# Patient Record
Sex: FEMALE | Race: WHITE | NOT HISPANIC OR LATINO
[De-identification: names, ages, dates, MRNs, and addresses within clinical notes are randomized per-mention and may not be internally consistent; named-entity substitution may affect disease eponyms.]

---

## 2020-02-12 PROBLEM — Z00.00 ENCOUNTER FOR PREVENTIVE HEALTH EXAMINATION: Status: ACTIVE | Noted: 2020-02-12

## 2020-02-21 ENCOUNTER — APPOINTMENT (OUTPATIENT)
Dept: FAMILY MEDICINE | Facility: CLINIC | Age: 50
End: 2020-02-21

## 2020-02-21 ENCOUNTER — APPOINTMENT (OUTPATIENT)
Dept: OBGYN | Facility: CLINIC | Age: 50
End: 2020-02-21
Payer: MEDICAID

## 2020-02-21 VITALS
BODY MASS INDEX: 19.66 KG/M2 | HEIGHT: 65 IN | WEIGHT: 118 LBS | SYSTOLIC BLOOD PRESSURE: 118 MMHG | DIASTOLIC BLOOD PRESSURE: 70 MMHG

## 2020-02-21 DIAGNOSIS — Z80.0 FAMILY HISTORY OF MALIGNANT NEOPLASM OF DIGESTIVE ORGANS: ICD-10-CM

## 2020-02-21 DIAGNOSIS — Z78.9 OTHER SPECIFIED HEALTH STATUS: ICD-10-CM

## 2020-02-21 DIAGNOSIS — Z83.3 FAMILY HISTORY OF DIABETES MELLITUS: ICD-10-CM

## 2020-02-21 DIAGNOSIS — Z82.49 FAMILY HISTORY OF ISCHEMIC HEART DISEASE AND OTHER DISEASES OF THE CIRCULATORY SYSTEM: ICD-10-CM

## 2020-02-21 LAB
HCG UR QL: NEGATIVE
QUALITY CONTROL: YES

## 2020-02-21 PROCEDURE — 81025 URINE PREGNANCY TEST: CPT

## 2020-02-21 PROCEDURE — 99386 PREV VISIT NEW AGE 40-64: CPT

## 2020-02-21 NOTE — HISTORY OF PRESENT ILLNESS
[Last Mammogram ___] : Last Mammogram was [unfilled] [Last Pap ___] : Last cervical pap smear was [unfilled] [Irregular Menses] : irregular menses [Definite:  ___ (Date)] : the last menstrual period was [unfilled] [Sexually Active] : is sexually active [Male ___] : [unfilled] male

## 2020-02-21 NOTE — PHYSICAL EXAM
[Awake] : awake [Alert] : alert [Soft] : soft [Oriented x3] : oriented to person, place, and time [Normal] : uterus [Scant] : there was scant vaginal bleeding [IUD String] : had an IUD string protruding out [Adnexa Tenderness On The Right] : was tender to palpation [Acute Distress] : no acute distress [Mass] : no breast mass [Nipple Discharge] : no nipple discharge [Axillary LAD] : no axillary lymphadenopathy [Tender] : non tender

## 2020-02-21 NOTE — REVIEW OF SYSTEMS
[Chest Pain] : chest pain [Palpitations] : palpitations [Burning Sensation] : burning sensation during urination [Nocturia] : nocturia [FreeTextEntry1] : neck pain

## 2020-02-21 NOTE — DISCUSSION/SUMMARY
[FreeTextEntry1] : Discussed with the Mirena, spotting and irregular bleeding is not uncommon. \par Pelvic sonogram to be scheduled for right lower quadrant pelvic pain.\par Rx sent for 800mg ibuprofen for pain.\par Referral for mammogram and sonogram given. \par States she is scheduled for a colonoscopy on Tuesday with her gastroenterologist. \par Concerns addressed, questions answered. Patient verbalizes understanding.

## 2020-02-22 LAB
C TRACH RRNA SPEC QL NAA+PROBE: NOT DETECTED
N GONORRHOEA RRNA SPEC QL NAA+PROBE: NOT DETECTED
SOURCE TP AMPLIFICATION: NORMAL

## 2020-02-25 LAB
CYTOLOGY CVX/VAG DOC THIN PREP: NORMAL
HPV HIGH+LOW RISK DNA PNL CVX: NOT DETECTED

## 2020-02-26 ENCOUNTER — APPOINTMENT (OUTPATIENT)
Dept: OBGYN | Facility: CLINIC | Age: 50
End: 2020-02-26
Payer: MEDICAID

## 2020-02-26 ENCOUNTER — ASOB RESULT (OUTPATIENT)
Age: 50
End: 2020-02-26

## 2020-02-26 VITALS
BODY MASS INDEX: 22.2 KG/M2 | HEIGHT: 64 IN | WEIGHT: 130 LBS | DIASTOLIC BLOOD PRESSURE: 60 MMHG | SYSTOLIC BLOOD PRESSURE: 100 MMHG

## 2020-02-26 PROCEDURE — 76376 3D RENDER W/INTRP POSTPROCES: CPT | Mod: 59

## 2020-02-26 PROCEDURE — 99213 OFFICE O/P EST LOW 20 MIN: CPT | Mod: 25

## 2020-02-26 PROCEDURE — 76830 TRANSVAGINAL US NON-OB: CPT

## 2020-02-26 NOTE — PHYSICAL EXAM
[Awake] : awake [Alert] : alert [Soft] : soft [Oriented x3] : oriented to person, place, and time [Tender] : non tender

## 2020-02-26 NOTE — HISTORY OF PRESENT ILLNESS
[Last Mammogram ___] : Last Mammogram was [unfilled] [Last Pap ___] : Last cervical pap smear was [unfilled] [Menstrual Problems] : reports abnormal menses [Excessive Bleeding] : bleeding has been excessive [Irregular Cycle Intervals] : are  irregular [Irregular Duration] : has been irregular [Dysmenorrhea] : dysmenorrhea [Total Preg ___] : [unfilled] [Pregnancy History] : pregnancy history: [Full Term ___] : [unfilled] [Living ___] : [unfilled] [AB Spont ___] : miscarriages: [unfilled] [Definite:  ___ (Date)] : the last menstrual period was [unfilled] [Sexually Active] : is sexually active [Menarche Age: ____] : age at menarche was [unfilled] [Contraception] : uses contraception [Male ___] : [unfilled] male [IUD] : has an intrauterine device [Irregular Menses] : irregular menses [Light Bleeding] : described as light in severity [Prolonged Menses] : prolonged menses [de-identified] : Pelvic u/s: 02/26/2020 [de-identified] : Mirena [Pain] : no pelvic pain [Dizziness] : no dizziness [Monogamous] : is not monogamous

## 2020-02-26 NOTE — DISCUSSION/SUMMARY
[FreeTextEntry1] : Discussed pap smear results from last visit.\par Liz amador reviewed with patient: IUD was seen in uterus; a small fibroid noted anterior submucosal; right ovary appers within normal limits; a simple cyst on left ovary and no free fluid.\par Discussed abnormal bleeding continues. She has Mirena in place since January/2019 for the irregular bleeding and pelvic pain but has not resolved the abnormal bleeding. \par Reviewed hysteroscopy procedure. Will be making appointment today. Advised to take ibuprofen prior to procedure.\par If vaginal bleeding and pelvic pain persists or worsens, is to contact office and come in sooner.\par Patient concerns addressed, questions answered. She verbalized understanding.

## 2020-03-03 ENCOUNTER — RESULT CHARGE (OUTPATIENT)
Age: 50
End: 2020-03-03

## 2020-03-03 ENCOUNTER — APPOINTMENT (OUTPATIENT)
Dept: OBGYN | Facility: CLINIC | Age: 50
End: 2020-03-03
Payer: MEDICAID

## 2020-03-03 VITALS
BODY MASS INDEX: 21.85 KG/M2 | HEIGHT: 64 IN | SYSTOLIC BLOOD PRESSURE: 104 MMHG | DIASTOLIC BLOOD PRESSURE: 52 MMHG | WEIGHT: 128 LBS

## 2020-03-03 DIAGNOSIS — Z01.419 ENCOUNTER FOR GYNECOLOGICAL EXAMINATION (GENERAL) (ROUTINE) W/OUT ABNORMAL FINDINGS: ICD-10-CM

## 2020-03-03 DIAGNOSIS — Z12.39 ENCOUNTER FOR OTHER SCREENING FOR MALIGNANT NEOPLASM OF BREAST: ICD-10-CM

## 2020-03-03 LAB
BILIRUB UR QL STRIP: NORMAL
GLUCOSE UR-MCNC: NORMAL
HCG UR QL: 0.2 EU/DL
HCG UR QL: NEGATIVE
HGB UR QL STRIP.AUTO: ABNORMAL
KETONES UR-MCNC: NORMAL
LEUKOCYTE ESTERASE UR QL STRIP: NORMAL
NITRITE UR QL STRIP: NORMAL
PH UR STRIP: 6.5
PROT UR STRIP-MCNC: NORMAL
QUALITY CONTROL: YES
SP GR UR STRIP: 1.02

## 2020-03-03 PROCEDURE — 58558Z: CUSTOM

## 2020-03-03 PROCEDURE — 81003 URINALYSIS AUTO W/O SCOPE: CPT | Mod: QW

## 2020-03-03 PROCEDURE — 36415 COLL VENOUS BLD VENIPUNCTURE: CPT

## 2020-03-03 PROCEDURE — 58301 REMOVE INTRAUTERINE DEVICE: CPT | Mod: 59

## 2020-03-03 PROCEDURE — 81025 URINE PREGNANCY TEST: CPT

## 2020-03-03 NOTE — PROCEDURE
[Alternatives] : alternatives [Benefits] : benefits [Allergic Reaction] : allergic reaction [Bleeding] : bleeding [Pain] : pain [Uterine Perforation] : uterine perforation [LMP ___] : LMP was [unfilled] [Neg Pregnancy Test] : a pregnancy test was negative [Tolerated Well] : the patient tolerated the procedure well [No Complications] : there were no complications

## 2020-03-03 NOTE — PHYSICAL EXAM
[Awake] : awake [Alert] : alert [Oriented x3] : oriented to person, place, and time [Labia Majora] : labia major [Labia Minora] : labia minora [Normal] : clitoris [Depressed Mood] : not depressed [Acute Distress] : no acute distress [Flat Affect] : affect not flat [FreeTextEntry5] : IUD was removed

## 2020-03-03 NOTE — REVIEW OF SYSTEMS
[Nl] : Integumentary [Fever] : no fever [Chills] : no chills [Pelvic Pain] : pelvic pain [Abn Vag Bleeding] : abnormal vaginal bleeding

## 2020-03-03 NOTE — HISTORY OF PRESENT ILLNESS
[___ Month(s) Ago] : [unfilled] month(s) ago [Good] : being in good health [Last Pap ___] : Last cervical pap smear was [unfilled] [Healthy Diet] : a healthy diet [Perimenopausal] : is perimenopausal [Pregnancy History] : pregnancy history: [Definite:  ___ (Date)] : the last menstrual period was [unfilled] [Menarche Age: ____] : age at menarche was [unfilled] [Sexually Active] : is sexually active [Monogamous] : is monogamous [Contraception] : uses contraception [Male ___] : [unfilled] male [Medroxyprogesterone Injection] : uses medroxyprogesterone acetate injection [No] : no [Hot Flashes] : no hot flashes [Night Sweats] : no night sweats

## 2020-03-04 LAB
BASOPHILS # BLD AUTO: 0.04 K/UL
BASOPHILS NFR BLD AUTO: 0.7 %
EOSINOPHIL # BLD AUTO: 0.1 K/UL
EOSINOPHIL NFR BLD AUTO: 1.8 %
FSH SERPL-MCNC: 3.9 IU/L
HCT VFR BLD CALC: 42.5 %
HGB BLD-MCNC: 13.3 G/DL
IMM GRANULOCYTES NFR BLD AUTO: 0.2 %
LH SERPL-ACNC: 9.8 IU/L
LYMPHOCYTES # BLD AUTO: 2.09 K/UL
LYMPHOCYTES NFR BLD AUTO: 36.6 %
MAN DIFF?: NORMAL
MCHC RBC-ENTMCNC: 28.7 PG
MCHC RBC-ENTMCNC: 31.3 GM/DL
MCV RBC AUTO: 91.6 FL
MONOCYTES # BLD AUTO: 0.42 K/UL
MONOCYTES NFR BLD AUTO: 7.4 %
NEUTROPHILS # BLD AUTO: 3.05 K/UL
NEUTROPHILS NFR BLD AUTO: 53.3 %
PLATELET # BLD AUTO: 318 K/UL
PROLACTIN SERPL-MCNC: 15.4 NG/ML
RBC # BLD: 4.64 M/UL
RBC # FLD: 13.2 %
TSH SERPL-ACNC: 3.33 UIU/ML
WBC # FLD AUTO: 5.71 K/UL

## 2020-03-11 ENCOUNTER — APPOINTMENT (OUTPATIENT)
Dept: MAMMOGRAPHY | Facility: CLINIC | Age: 50
End: 2020-03-11
Payer: MEDICAID

## 2020-03-11 ENCOUNTER — OUTPATIENT (OUTPATIENT)
Dept: OUTPATIENT SERVICES | Facility: HOSPITAL | Age: 50
LOS: 1 days | End: 2020-03-11
Payer: MEDICAID

## 2020-03-11 ENCOUNTER — APPOINTMENT (OUTPATIENT)
Dept: ULTRASOUND IMAGING | Facility: CLINIC | Age: 50
End: 2020-03-11
Payer: MEDICAID

## 2020-03-11 DIAGNOSIS — Z12.39 ENCOUNTER FOR OTHER SCREENING FOR MALIGNANT NEOPLASM OF BREAST: ICD-10-CM

## 2020-03-11 PROCEDURE — 77063 BREAST TOMOSYNTHESIS BI: CPT | Mod: 26

## 2020-03-11 PROCEDURE — 77067 SCR MAMMO BI INCL CAD: CPT | Mod: 26

## 2020-03-11 PROCEDURE — 77063 BREAST TOMOSYNTHESIS BI: CPT

## 2020-03-11 PROCEDURE — 77067 SCR MAMMO BI INCL CAD: CPT

## 2020-03-11 PROCEDURE — 76641 ULTRASOUND BREAST COMPLETE: CPT

## 2020-03-11 PROCEDURE — 76641 ULTRASOUND BREAST COMPLETE: CPT | Mod: 26,50

## 2020-03-13 ENCOUNTER — APPOINTMENT (OUTPATIENT)
Dept: OBGYN | Facility: CLINIC | Age: 50
End: 2020-03-13
Payer: MEDICAID

## 2020-03-13 ENCOUNTER — TRANSCRIPTION ENCOUNTER (OUTPATIENT)
Age: 50
End: 2020-03-13

## 2020-03-13 VITALS
HEIGHT: 64 IN | DIASTOLIC BLOOD PRESSURE: 64 MMHG | SYSTOLIC BLOOD PRESSURE: 102 MMHG | BODY MASS INDEX: 21 KG/M2 | WEIGHT: 123 LBS

## 2020-03-13 LAB — CORE LAB BIOPSY: NORMAL

## 2020-03-13 PROCEDURE — 99213 OFFICE O/P EST LOW 20 MIN: CPT

## 2020-03-13 RX ORDER — LEVONORGESTREL 52 MG/1
20 INTRAUTERINE DEVICE INTRAUTERINE
Refills: 0 | Status: DISCONTINUED | COMMUNITY
End: 2020-03-13

## 2020-03-13 RX ORDER — IBUPROFEN 800 MG/1
800 TABLET, FILM COATED ORAL EVERY 8 HOURS
Qty: 90 | Refills: 0 | Status: DISCONTINUED | COMMUNITY
Start: 2020-02-21 | End: 2020-03-13

## 2020-03-13 NOTE — HISTORY OF PRESENT ILLNESS
[Last Pap ___] : Last cervical pap smear was [unfilled] [Last Pap Smear ___] : last Papanicolaou cytology done [unfilled] [Annual Vaginal Sonography ___] : annual vaginal sonograph [unfilled] [Perimenopausal] : is perimenopausal [Menstrual Problems] : reports abnormal menses [Menarche Age ____] : age at menarche was [unfilled] [Definite ___ (Date)] : the last menstrual period was [unfilled] [Pregnancy History] : pregnancy history: [Total Preg ___] : [unfilled] [Full Term ___] : [unfilled] [Living ___] : [unfilled] [AB Spont ___] : miscarriages: [unfilled] [Bleeding] : bleeding [Definite:  ___ (Date)] : the last menstrual period was [unfilled] [Menarche Age: ____] : age at menarche was [unfilled] [Sexually Active] : is sexually active [Monogamous] : is monogamous [Contraception] : uses contraception [IUD] : has an intrauterine device [Male ___] : [unfilled] male [NA] : N/A [de-identified] : emb 3/3/2020

## 2020-03-13 NOTE — REVIEW OF SYSTEMS
[Nl] : Hematologic/Lymphatic [Fever] : no fever [Chills] : no chills [Pelvic Pain] : no pelvic pain [Abn Vag Bleeding] : abnormal vaginal bleeding

## 2020-04-16 ENCOUNTER — APPOINTMENT (OUTPATIENT)
Dept: OBGYN | Facility: CLINIC | Age: 50
End: 2020-04-16

## 2020-04-30 ENCOUNTER — APPOINTMENT (OUTPATIENT)
Dept: OBGYN | Facility: CLINIC | Age: 50
End: 2020-04-30

## 2020-07-27 ENCOUNTER — OUTPATIENT (OUTPATIENT)
Dept: OUTPATIENT SERVICES | Facility: HOSPITAL | Age: 50
LOS: 1 days | End: 2020-07-27
Payer: MEDICAID

## 2020-07-27 DIAGNOSIS — Z01.818 ENCOUNTER FOR OTHER PREPROCEDURAL EXAMINATION: ICD-10-CM

## 2020-07-27 LAB
ANION GAP SERPL CALC-SCNC: 11 MMOL/L — SIGNIFICANT CHANGE UP (ref 5–17)
APTT BLD: 30.5 SEC — SIGNIFICANT CHANGE UP (ref 27.5–35.5)
BASOPHILS # BLD AUTO: 0.04 K/UL — SIGNIFICANT CHANGE UP (ref 0–0.2)
BASOPHILS NFR BLD AUTO: 0.8 % — SIGNIFICANT CHANGE UP (ref 0–2)
BLD GP AB SCN SERPL QL: SIGNIFICANT CHANGE UP
BUN SERPL-MCNC: 19 MG/DL — SIGNIFICANT CHANGE UP (ref 8–20)
CALCIUM SERPL-MCNC: 9.3 MG/DL — SIGNIFICANT CHANGE UP (ref 8.6–10.2)
CHLORIDE SERPL-SCNC: 102 MMOL/L — SIGNIFICANT CHANGE UP (ref 98–107)
CO2 SERPL-SCNC: 25 MMOL/L — SIGNIFICANT CHANGE UP (ref 22–29)
CREAT SERPL-MCNC: 0.47 MG/DL — LOW (ref 0.5–1.3)
EOSINOPHIL # BLD AUTO: 0.08 K/UL — SIGNIFICANT CHANGE UP (ref 0–0.5)
EOSINOPHIL NFR BLD AUTO: 1.7 % — SIGNIFICANT CHANGE UP (ref 0–6)
GLUCOSE SERPL-MCNC: 89 MG/DL — SIGNIFICANT CHANGE UP (ref 70–99)
HCG UR QL: NEGATIVE — SIGNIFICANT CHANGE UP
HCT VFR BLD CALC: 32 % — LOW (ref 34.5–45)
HGB BLD-MCNC: 9.9 G/DL — LOW (ref 11.5–15.5)
IMM GRANULOCYTES NFR BLD AUTO: 0.2 % — SIGNIFICANT CHANGE UP (ref 0–1.5)
INR BLD: 1.08 RATIO — SIGNIFICANT CHANGE UP (ref 0.88–1.16)
LYMPHOCYTES # BLD AUTO: 1.56 K/UL — SIGNIFICANT CHANGE UP (ref 1–3.3)
LYMPHOCYTES # BLD AUTO: 32.3 % — SIGNIFICANT CHANGE UP (ref 13–44)
MCHC RBC-ENTMCNC: 24.1 PG — LOW (ref 27–34)
MCHC RBC-ENTMCNC: 30.9 GM/DL — LOW (ref 32–36)
MCV RBC AUTO: 78 FL — LOW (ref 80–100)
MONOCYTES # BLD AUTO: 0.29 K/UL — SIGNIFICANT CHANGE UP (ref 0–0.9)
MONOCYTES NFR BLD AUTO: 6 % — SIGNIFICANT CHANGE UP (ref 2–14)
NEUTROPHILS # BLD AUTO: 2.85 K/UL — SIGNIFICANT CHANGE UP (ref 1.8–7.4)
NEUTROPHILS NFR BLD AUTO: 59 % — SIGNIFICANT CHANGE UP (ref 43–77)
PLATELET # BLD AUTO: 307 K/UL — SIGNIFICANT CHANGE UP (ref 150–400)
POTASSIUM SERPL-MCNC: 4.1 MMOL/L — SIGNIFICANT CHANGE UP (ref 3.5–5.3)
POTASSIUM SERPL-SCNC: 4.1 MMOL/L — SIGNIFICANT CHANGE UP (ref 3.5–5.3)
PROTHROM AB SERPL-ACNC: 12.5 SEC — SIGNIFICANT CHANGE UP (ref 10.6–13.6)
RBC # BLD: 4.1 M/UL — SIGNIFICANT CHANGE UP (ref 3.8–5.2)
RBC # FLD: 13.5 % — SIGNIFICANT CHANGE UP (ref 10.3–14.5)
SODIUM SERPL-SCNC: 138 MMOL/L — SIGNIFICANT CHANGE UP (ref 135–145)
WBC # BLD: 4.83 K/UL — SIGNIFICANT CHANGE UP (ref 3.8–10.5)
WBC # FLD AUTO: 4.83 K/UL — SIGNIFICANT CHANGE UP (ref 3.8–10.5)

## 2020-07-27 PROCEDURE — G0463: CPT

## 2020-07-27 PROCEDURE — 93005 ELECTROCARDIOGRAM TRACING: CPT

## 2020-07-27 PROCEDURE — 93010 ELECTROCARDIOGRAM REPORT: CPT

## 2020-08-03 ENCOUNTER — APPOINTMENT (OUTPATIENT)
Dept: OBGYN | Facility: CLINIC | Age: 50
End: 2020-08-03
Payer: MEDICAID

## 2020-08-03 VITALS
DIASTOLIC BLOOD PRESSURE: 62 MMHG | BODY MASS INDEX: 22.02 KG/M2 | SYSTOLIC BLOOD PRESSURE: 104 MMHG | HEIGHT: 64 IN | WEIGHT: 129 LBS

## 2020-08-03 LAB
BILIRUB UR QL STRIP: NORMAL
GLUCOSE UR-MCNC: NORMAL
HCG UR QL: 0.2 EU/DL
HCG UR QL: NEGATIVE
HGB UR QL STRIP.AUTO: NORMAL
KETONES UR-MCNC: NORMAL
LEUKOCYTE ESTERASE UR QL STRIP: NORMAL
NITRITE UR QL STRIP: NORMAL
PH UR STRIP: 7
PROT UR STRIP-MCNC: NORMAL
QUALITY CONTROL: YES
SP GR UR STRIP: 1.01

## 2020-08-03 PROCEDURE — 99214 OFFICE O/P EST MOD 30 MIN: CPT

## 2020-08-03 PROCEDURE — 81003 URINALYSIS AUTO W/O SCOPE: CPT | Mod: QW

## 2020-08-03 PROCEDURE — 81025 URINE PREGNANCY TEST: CPT

## 2020-08-03 NOTE — REVIEW OF SYSTEMS
[Nl] : Hematologic/Lymphatic [Fever] : no fever [Chills] : no chills [Heart Rate Is Fast] : the heart rate was not fast [Palpitations] : no palpitations [Chest Pain] : no chest pain [Dyspnea] : no shortness of breath [SOB on Exertion] : no shortness of breath during exertion [Cough] : no cough [Abdominal Pain] : no abdominal pain [Vomiting] : no vomiting [Constipation] : no constipation [Diarrhea] : no diarrhea [Pelvic Pain] : no pelvic pain [Abn Vag Bleeding] : abnormal vaginal bleeding

## 2020-08-03 NOTE — HISTORY OF PRESENT ILLNESS
[Last Pap ___] : Last cervical pap smear was [unfilled] [Last Mammogram ___] : last mammogram done [unfilled] [Last Pap Smear ___] : last Papanicolaou cytology done [unfilled] [Annual Vaginal Sonography ___] : annual vaginal sonograph [unfilled] [Menstrual Problems] : reports abnormal menses [Perimenopausal] : is perimenopausal [Menarche Age ____] : age at menarche was [unfilled] [Pregnancy History] : pregnancy history: [Total Preg ___] : [unfilled] [Full Term ___] : [unfilled] [Living ___] : [unfilled] [AB Induced ___] : elective abortions: [unfilled] [Definite:  ___ (Date)] : the last menstrual period was [unfilled] [Menarche Age: ____] : age at menarche was [unfilled] [Sexually Active] : is sexually active [Contraception] : uses contraception [IUD] : has an intrauterine device [Male ___] : [unfilled] male [No] : no [de-identified] : luna  3/11/2020 br2

## 2020-08-04 ENCOUNTER — APPOINTMENT (OUTPATIENT)
Dept: DISASTER EMERGENCY | Facility: CLINIC | Age: 50
End: 2020-08-04

## 2020-08-05 LAB — SARS-COV-2 N GENE NPH QL NAA+PROBE: NOT DETECTED

## 2020-08-07 ENCOUNTER — RESULT REVIEW (OUTPATIENT)
Age: 50
End: 2020-08-07

## 2020-08-07 ENCOUNTER — APPOINTMENT (OUTPATIENT)
Dept: OBGYN | Facility: AMBULATORY SURGERY CENTER | Age: 50
End: 2020-08-07
Payer: MEDICAID

## 2020-08-07 PROCEDURE — 58558 HYSTEROSCOPY BIOPSY: CPT

## 2020-08-11 ENCOUNTER — TRANSCRIPTION ENCOUNTER (OUTPATIENT)
Age: 50
End: 2020-08-11

## 2020-08-17 ENCOUNTER — APPOINTMENT (OUTPATIENT)
Dept: OBGYN | Facility: CLINIC | Age: 50
End: 2020-08-17
Payer: MEDICAID

## 2020-08-17 VITALS
SYSTOLIC BLOOD PRESSURE: 110 MMHG | WEIGHT: 127 LBS | HEIGHT: 64 IN | DIASTOLIC BLOOD PRESSURE: 70 MMHG | BODY MASS INDEX: 21.68 KG/M2

## 2020-08-17 PROCEDURE — 99213 OFFICE O/P EST LOW 20 MIN: CPT

## 2020-08-17 PROCEDURE — 36415 COLL VENOUS BLD VENIPUNCTURE: CPT

## 2020-08-17 RX ORDER — UBIDECARENONE 200 MG
500 CAPSULE ORAL
Refills: 0 | Status: ACTIVE | COMMUNITY

## 2020-08-17 RX ORDER — CHOLECALCIFEROL (VITAMIN D3) 50 MCG
2000 CAPSULE ORAL
Refills: 0 | Status: ACTIVE | COMMUNITY

## 2020-08-17 RX ORDER — OMEGA-3/DHA/EPA/FISH OIL 300-1000MG
400 CAPSULE ORAL
Refills: 0 | Status: ACTIVE | COMMUNITY

## 2020-08-18 NOTE — REVIEW OF SYSTEMS
[Nl] : Integumentary [Fever] : no fever [Chills] : no chills [Abdominal Pain] : no abdominal pain [Vomiting] : no vomiting [Abn Vag Bleeding] : no abnormal vaginal bleeding [Pelvic Pain] : no pelvic pain [Dysuria] : no dysuria [Urgency] : no urgency

## 2020-08-18 NOTE — END OF VISIT
[FreeTextEntry3] : I, Martha Carmona, solely acted as scribe for Dr. Emelyn Liao on 08/17/2020 .\par All medical entries made by the Scribe were at my, Dr. Liao's, direction and personally dictated by me on 08/17/2020. I have reviewed the chart and agree that the record accurately reflects my personal performance of the history, physical exam, assessment and plan. I have also personally directed, reviewed, and agreed with the chart.

## 2020-08-18 NOTE — HISTORY OF PRESENT ILLNESS
[Last Mammogram ___] : Last Mammogram was [unfilled] [Last Pap ___] : Last cervical pap smear was [unfilled] [Pregnancy History] : pregnancy history: [Total Preg ___] : [unfilled] [Full Term ___] : [unfilled] [Living ___] : [unfilled] [AB Spont ___] : miscarriages: [unfilled] [Sexually Active] : is sexually active [de-identified] : Pelvic US 02/26/2020 [Fever] : no fever [Nausea] : no nausea [Vomiting] : no vomiting [Diarrhea] : no diarrhea [Vaginal Bleeding] : no vaginal bleeding [Pelvic Pressure] : no pelvic pressure [Dysuria] : no dysuria

## 2020-08-19 ENCOUNTER — TRANSCRIPTION ENCOUNTER (OUTPATIENT)
Age: 50
End: 2020-08-19

## 2020-08-19 LAB
BASOPHILS # BLD AUTO: 0.05 K/UL
BASOPHILS NFR BLD AUTO: 1 %
EOSINOPHIL # BLD AUTO: 0.11 K/UL
EOSINOPHIL NFR BLD AUTO: 2.1 %
HCT VFR BLD CALC: 32.3 %
HGB BLD-MCNC: 9.5 G/DL
IMM GRANULOCYTES NFR BLD AUTO: 0.2 %
LYMPHOCYTES # BLD AUTO: 1.81 K/UL
LYMPHOCYTES NFR BLD AUTO: 35.2 %
MAN DIFF?: NORMAL
MCHC RBC-ENTMCNC: 23 PG
MCHC RBC-ENTMCNC: 29.4 GM/DL
MCV RBC AUTO: 78.2 FL
MONOCYTES # BLD AUTO: 0.3 K/UL
MONOCYTES NFR BLD AUTO: 5.8 %
NEUTROPHILS # BLD AUTO: 2.86 K/UL
NEUTROPHILS NFR BLD AUTO: 55.7 %
PLATELET # BLD AUTO: 354 K/UL
RBC # BLD: 4.13 M/UL
RBC # FLD: 15.2 %
TSH SERPL-ACNC: 3.72 UIU/ML
WBC # FLD AUTO: 5.14 K/UL

## 2021-03-10 ENCOUNTER — OUTPATIENT (OUTPATIENT)
Dept: OUTPATIENT SERVICES | Facility: HOSPITAL | Age: 51
LOS: 1 days | End: 2021-03-10
Payer: COMMERCIAL

## 2021-03-10 ENCOUNTER — APPOINTMENT (OUTPATIENT)
Dept: ULTRASOUND IMAGING | Facility: CLINIC | Age: 51
End: 2021-03-10
Payer: MEDICAID

## 2021-03-10 ENCOUNTER — APPOINTMENT (OUTPATIENT)
Dept: MAMMOGRAPHY | Facility: CLINIC | Age: 51
End: 2021-03-10
Payer: MEDICAID

## 2021-03-10 DIAGNOSIS — Z00.8 ENCOUNTER FOR OTHER GENERAL EXAMINATION: ICD-10-CM

## 2021-03-10 DIAGNOSIS — Z00.00 ENCOUNTER FOR GENERAL ADULT MEDICAL EXAMINATION WITHOUT ABNORMAL FINDINGS: ICD-10-CM

## 2021-03-10 PROCEDURE — G0279: CPT | Mod: 26

## 2021-03-10 PROCEDURE — 77066 DX MAMMO INCL CAD BI: CPT | Mod: 26

## 2021-03-10 PROCEDURE — 76641 ULTRASOUND BREAST COMPLETE: CPT | Mod: 26,50

## 2021-03-10 PROCEDURE — 77066 DX MAMMO INCL CAD BI: CPT

## 2021-03-10 PROCEDURE — 76641 ULTRASOUND BREAST COMPLETE: CPT

## 2021-03-10 PROCEDURE — G0279: CPT

## 2021-03-19 ENCOUNTER — APPOINTMENT (OUTPATIENT)
Dept: OBGYN | Facility: CLINIC | Age: 51
End: 2021-03-19
Payer: MEDICAID

## 2021-03-19 VITALS
BODY MASS INDEX: 21.51 KG/M2 | SYSTOLIC BLOOD PRESSURE: 112 MMHG | DIASTOLIC BLOOD PRESSURE: 70 MMHG | HEIGHT: 64 IN | TEMPERATURE: 97 F | WEIGHT: 126 LBS

## 2021-03-19 DIAGNOSIS — Z86.2 PERSONAL HISTORY OF DISEASES OF THE BLOOD AND BLOOD-FORMING ORGANS AND CERTAIN DISORDERS INVOLVING THE IMMUNE MECHANISM: ICD-10-CM

## 2021-03-19 DIAGNOSIS — N84.0 POLYP OF CORPUS UTERI: ICD-10-CM

## 2021-03-19 DIAGNOSIS — N92.1 EXCESSIVE AND FREQUENT MENSTRUATION WITH IRREGULAR CYCLE: ICD-10-CM

## 2021-03-19 DIAGNOSIS — R10.2 PELVIC AND PERINEAL PAIN: ICD-10-CM

## 2021-03-19 DIAGNOSIS — Z01.818 ENCOUNTER FOR OTHER PREPROCEDURAL EXAMINATION: ICD-10-CM

## 2021-03-19 PROCEDURE — 99072 ADDL SUPL MATRL&STAF TM PHE: CPT

## 2021-03-19 PROCEDURE — 99396 PREV VISIT EST AGE 40-64: CPT

## 2021-03-20 PROBLEM — Z01.818 PRE-OP TESTING: Status: RESOLVED | Noted: 2020-07-31 | Resolved: 2021-03-20

## 2021-03-20 PROBLEM — R10.2 PELVIC PAIN: Status: RESOLVED | Noted: 2020-02-21 | Resolved: 2021-03-20

## 2021-03-20 PROBLEM — Z86.2 HISTORY OF ANEMIA: Status: RESOLVED | Noted: 2020-08-17 | Resolved: 2021-03-20

## 2021-03-20 PROBLEM — N92.1 MENOMETRORRHAGIA: Status: RESOLVED | Noted: 2020-03-03 | Resolved: 2021-03-20

## 2021-03-20 PROBLEM — N84.0 ENDOMETRIAL POLYP: Status: RESOLVED | Noted: 2020-03-03 | Resolved: 2021-03-20

## 2021-03-20 NOTE — DISCUSSION/SUMMARY
[FreeTextEntry1] : Pap done today. \par \par Prescription for mammogram screening and breast US given. \par \par Labs reviewed from primary which revealed she was no longer anemic. \par \par Discussed IUD risks and benefits in details. IUD literature provided. Patient will consider. \par \par Self-breast exam reviewed. \par \par Colonoscopy encouraged. \par \par She will follow up annually and as needed.

## 2021-03-20 NOTE — HISTORY OF PRESENT ILLNESS
[N] : Patient does not use contraception [Menarche Age: ____] : age at menarche was [unfilled] [Currently Active] : currently active [Men] : men [No] : No [Patient refuses STI testing] : Patient refuses STI testing [Y] : Positive pregnancy history [Mammogramdate] : 03/11/2020 [TextBox_19] : BR 2 [PapSmeardate] : 02/21/2020 [TextBox_31] : neg [HPVDate] : 02/21/2020 [TextBox_78] : neg [LMPDate] : 03/07/2021 [PGHxTotal] : 2 [[facility]] : 2 [[facility]] : 2 [FreeTextEntry1] : 03/07/21

## 2021-03-20 NOTE — END OF VISIT
[FreeTextEntry3] : I, Teresa Ignacio, solely acted as a scribe for Dr. Emelyn Liao on 03/19/2021 . All medical entries made by the Scribe were at my, Dr. Liao's, direction and personally dictated by me on 03/19/2021. I have reviewed the chart and agree that the record accurately reflects my personal performance of the history, physical exam, assessment and plan. I have also personally directed, reviewed and agreed with the chart.

## 2021-03-20 NOTE — PHYSICAL EXAM
[Appropriately responsive] : appropriately responsive [Alert] : alert [No Acute Distress] : no acute distress [Soft] : soft [Non-tender] : non-tender [Non-distended] : non-distended [Oriented x3] : oriented x3 [Examination Of The Breasts] : a normal appearance [No Discharge] : no discharge [No Masses] : no breast masses were palpable [Labia Majora] : normal [Labia Minora] : normal [No Bleeding] : There was no active vaginal bleeding [Normal] : normal [Uterine Adnexae] : normal [FreeTextEntry9] : Guaiac negative

## 2021-03-21 LAB — HPV HIGH+LOW RISK DNA PNL CVX: NOT DETECTED

## 2021-03-25 ENCOUNTER — APPOINTMENT (OUTPATIENT)
Dept: OBGYN | Facility: CLINIC | Age: 51
End: 2021-03-25
Payer: MEDICAID

## 2021-03-25 ENCOUNTER — ASOB RESULT (OUTPATIENT)
Age: 51
End: 2021-03-25

## 2021-03-25 VITALS
HEIGHT: 64 IN | BODY MASS INDEX: 21.51 KG/M2 | WEIGHT: 126 LBS | SYSTOLIC BLOOD PRESSURE: 110 MMHG | TEMPERATURE: 96 F | DIASTOLIC BLOOD PRESSURE: 68 MMHG

## 2021-03-25 DIAGNOSIS — R92.2 INCONCLUSIVE MAMMOGRAM: ICD-10-CM

## 2021-03-25 DIAGNOSIS — Z01.419 ENCOUNTER FOR GYNECOLOGICAL EXAMINATION (GENERAL) (ROUTINE) W/OUT ABNORMAL FINDINGS: ICD-10-CM

## 2021-03-25 DIAGNOSIS — Z92.89 PERSONAL HISTORY OF OTHER MEDICAL TREATMENT: ICD-10-CM

## 2021-03-25 LAB
CYTOLOGY CVX/VAG DOC THIN PREP: NORMAL
HCG UR QL: NEGATIVE
QUALITY CONTROL: YES

## 2021-03-25 PROCEDURE — 99072 ADDL SUPL MATRL&STAF TM PHE: CPT

## 2021-03-25 PROCEDURE — 81025 URINE PREGNANCY TEST: CPT

## 2021-03-25 PROCEDURE — 76376 3D RENDER W/INTRP POSTPROCES: CPT | Mod: 59

## 2021-03-25 PROCEDURE — 58300 INSERT INTRAUTERINE DEVICE: CPT

## 2021-03-25 PROCEDURE — 76830 TRANSVAGINAL US NON-OB: CPT

## 2021-03-25 NOTE — DISCUSSION/SUMMARY
[FreeTextEntry1] : Mirena IUD was inserted without difficulty today. Patient tolerated well. Post procedure transvaginal sonogram confirmed correct intrauterine placement of the Mirena IUD. Call parameters discussed.\par \par She will follow up for IUD check with sono to confirm in 2 months. All questions and concerns were addressed.

## 2021-03-25 NOTE — PROCEDURE
[IUD Placement] : intrauterine device (IUD) placement [Time out performed] : Pre-procedure time out performed.  Patient's name, date of birth and procedure confirmed. [Consent Obtained] : Consent obtained [Risks] : risks [Benefits] : benefits [Alternatives] : alternatives [Patient] : patient [Infection] : infection [Bleeding] : bleeding [Pain] : pain [Expulsion] : expulsion [Failure] : failure [Uterine Perforation] : uterine perforation [Neg Pregnancy Test] : negative pregnancy test [Tenaculum] : Tenaculum [Required Dilation] : required dilation [Post Placement Transvag. US] : post placement transvaginal ultrasound [Mirena IUD] : Mirena IUD [Tolerated Well] : Patient tolerated the procedure well [No Complications] : No complications [de-identified] : prolonged menses [LMPDate] : 03/07/21  [de-identified] : [id number] [de-identified] : 05/2023

## 2021-03-25 NOTE — HISTORY OF PRESENT ILLNESS
[Patient reported mammogram was normal] : Patient reported mammogram was normal [Patient reported PAP Smear was normal] : Patient reported PAP Smear was normal [N] : Patient does not use contraception [Y] : Patient is sexually active [Menarche Age: ____] : age at menarche was [unfilled] [Currently Active] : currently active [Men] : men [Vaginal] : vaginal [Mammogramdate] : 3/11/2021 [TextBox_19] : BR2 [PapSmeardate] : 3/19/2021 [HPVDate] : 3/19/2021 [TextBox_78] : neg [LMPDate] : 3/7/2021 [PGHxTotal] : 2 [[facility]] : 2 [[facility]xLiving] : 2 [FreeTextEntry1] : 3/7/2021

## 2021-03-25 NOTE — END OF VISIT
[FreeTextEntry3] : I, Martha Carmona, solely acted as scribe for Dr. Emelyn Liao on 03/25/2021.\par All medical entries made by the Scribe were at my, Dr. Liao's, direction and personally dictated by me on 03/25/2021. I have reviewed the chart and agree that the record accurately reflects my personal performance of the history, physical exam, assessment and plan. I have also personally directed, reviewed, and agreed with the chart.

## 2021-05-24 ENCOUNTER — APPOINTMENT (OUTPATIENT)
Dept: OBGYN | Facility: CLINIC | Age: 51
End: 2021-05-24
Payer: MEDICAID

## 2021-05-24 ENCOUNTER — ASOB RESULT (OUTPATIENT)
Age: 51
End: 2021-05-24

## 2021-05-24 VITALS
SYSTOLIC BLOOD PRESSURE: 90 MMHG | HEIGHT: 64 IN | TEMPERATURE: 97.5 F | DIASTOLIC BLOOD PRESSURE: 60 MMHG | BODY MASS INDEX: 21.68 KG/M2 | WEIGHT: 127 LBS

## 2021-05-24 PROCEDURE — 76830 TRANSVAGINAL US NON-OB: CPT

## 2021-05-24 PROCEDURE — 99212 OFFICE O/P EST SF 10 MIN: CPT | Mod: 25

## 2021-05-24 PROCEDURE — 99072 ADDL SUPL MATRL&STAF TM PHE: CPT

## 2021-05-26 NOTE — DISCUSSION/SUMMARY
[FreeTextEntry1] : \par TVS reviewed: anteverted uterus with the IUD seen insitu, endometrium thickness 2.8 mm, Right ovary appears WNL, Left ovary simple cyst 1.4 cm, a simple left paraovarian cyst 2.4 cm, and no free fluid seen. We discussed IUD is seen in situ and can remain in place for 6-7 years. We reviewed of left simple and left simple paraovarian cyst of 2.4 cm seen on sono and will return in 3 months for cyst surveillance.\par \par The possibility of irregular bleeding in the first 3-6 months of Mirena use followed by increased likelihood of amenorrhea was reviewed and all questions answered.\par \par Advised to call with any problematic side-effects to discuss management or changing to another contraceptive method before discontinuing.\par \par rto 3 months for cyst surveillance with sono and prn.

## 2021-05-26 NOTE — HISTORY OF PRESENT ILLNESS
[Patient reported PAP Smear was normal] : Patient reported PAP Smear was normal [HPV test offered] : HPV test offered [Menarche Age ____] : age at menarche was [unfilled] [Definite ___ (Date)] : the last menstrual period was [unfilled] [Irregular Cycle Intervals] : are  irregular [IUD] : has an intrauterine device [Monogamous (Male Partner)] : is monogamous with a male partner [Y] : Positive pregnancy history [Menarche Age: ____] : age at menarche was [unfilled] [Yes] : Patient has concerns regarding sex [Currently Active] : currently active [Men] : men [Vaginal] : vaginal [No] : No [Patient refuses STI testing] : Patient refuses STI testing [Patient reported mammogram was normal] : Patient reported mammogram was normal [Patient reported breast sonogram was normal] : Patient reported breast sonogram was normal [TextBox_4] : PATIENT PRESENTS FOR IUD CHECK UP. [Mammogramdate] : 03/10/21 [TextBox_19] : AS PER PATIENT [BreastSonogramDate] : 03/10/21 [TextBox_25] : AS PER PATIENT [PapSmeardate] : 03/19/21 [TextBox_31] : NEG [HPVDate] : 03/19/21 [TextBox_78] : NEG [LMPDate] : 05/15/21 [de-identified] : MIRENA [PGHxTotal] : 2 [[facility]xFulerm] : 1 [[facility] Centeriving] : 1 [PGHxABSpont] : 1 [FreeTextEntry1] : PAIN

## 2021-05-27 ENCOUNTER — APPOINTMENT (OUTPATIENT)
Dept: OBGYN | Facility: CLINIC | Age: 51
End: 2021-05-27

## 2021-08-23 ENCOUNTER — ASOB RESULT (OUTPATIENT)
Age: 51
End: 2021-08-23

## 2021-08-23 ENCOUNTER — APPOINTMENT (OUTPATIENT)
Dept: OBGYN | Facility: CLINIC | Age: 51
End: 2021-08-23
Payer: MEDICAID

## 2021-08-23 VITALS
DIASTOLIC BLOOD PRESSURE: 60 MMHG | WEIGHT: 124 LBS | BODY MASS INDEX: 21.17 KG/M2 | SYSTOLIC BLOOD PRESSURE: 102 MMHG | HEIGHT: 64 IN

## 2021-08-23 PROCEDURE — 99213 OFFICE O/P EST LOW 20 MIN: CPT | Mod: 25

## 2021-08-23 PROCEDURE — 76376 3D RENDER W/INTRP POSTPROCES: CPT | Mod: 59

## 2021-08-23 PROCEDURE — 76830 TRANSVAGINAL US NON-OB: CPT

## 2021-08-23 PROCEDURE — 99072 ADDL SUPL MATRL&STAF TM PHE: CPT

## 2021-08-23 PROCEDURE — 36415 COLL VENOUS BLD VENIPUNCTURE: CPT

## 2021-08-23 NOTE — HISTORY OF PRESENT ILLNESS
[Patient reported PAP Smear was normal] : Patient reported PAP Smear was normal [HPV test offered] : HPV test offered [LMP unknown] : LMP unknown [N] : Patient reports normal menses [IUD] : has an intrauterine device [Monogamous (Male Partner)] : is monogamous with a male partner [Y] : Positive pregnancy history [Menarche Age: ____] : age at menarche was [unfilled] [Currently Active] : currently active [Men] : men [Vaginal] : vaginal [No] : No [Yes] : Yes [Patient refuses STI testing] : Patient refuses STI testing [FreeTextEntry1] : \par Telma presents for followup.\par \par She had Mirena IUD placed on 03/25/21. She had a followup sono on 05/24/21 which showed: anteverted uterus with the IUD seen insitu, endometrium thickness 2.8 mm, Right ovary appears WNL, Left ovary simple cyst 1.4 cm, a simple left paraovarian cyst 2.4 cm, and no free fluid seen. Is here to followup on cysts seen on sono. \par \par Sono done in office today.\par \par She reports continuing to have intermittent spotting with IUD. She denies pelvic pain. \par  [Mammogramdate] : 03/11/20 [TextBox_19] : BR2 [BreastSonogramDate] : 03/11/20 [TextBox_25] : BR2 [PapSmeardate] : 03/19/21 [TextBox_31] : NEG [HPVDate] : 03/19/21 [TextBox_78] : NEG [de-identified] : 03/2021 [PGHxTotal] : 2 [[facility] CenterxFulerm] : 1 [[facility] Centeriving] : 1 [PGHxABSpont] : 1 [FreeTextEntry3] : MELANIA 2021

## 2021-08-23 NOTE — DISCUSSION/SUMMARY
[FreeTextEntry1] : \par Today's sono 08/23/21 showed: anteverted uterus, endo thickness 7 mm with IUD seen in situ, normal appearing ovaries, previous Left Ovary cyst is no longer seen, clear Left para ovarian cyst is seen again, unchanged 2.4 cm, no free fluid seen. We reviewed resolved  left simple and the left simple paraovarian cyst of 2.4 cm seen on sono remains and did not change in size and will return in 3 months for cyst surveillance.\par \par The possibility of irregular bleeding in the first 3-6 months of Mirena use followed by increased likelihood of amenorrhea was reviewed and all questions answered. Patient requesting CBC and TSH to be drawn due to hx of menorrhagia and last labs were drawn 08/2020.\par \par Advised to call with any problematic side-effects to discuss management or changing to another contraceptive method before discontinuing.\par \par rto 3 months for cyst surveillance with sono and prn.

## 2021-08-24 LAB
BASOPHILS # BLD AUTO: 0.04 K/UL
BASOPHILS NFR BLD AUTO: 0.7 %
EOSINOPHIL # BLD AUTO: 0.14 K/UL
EOSINOPHIL NFR BLD AUTO: 2.5 %
HCT VFR BLD CALC: 43.6 %
HGB BLD-MCNC: 13.7 G/DL
IMM GRANULOCYTES NFR BLD AUTO: 0 %
LYMPHOCYTES # BLD AUTO: 1.98 K/UL
LYMPHOCYTES NFR BLD AUTO: 35.5 %
MAN DIFF?: NORMAL
MCHC RBC-ENTMCNC: 28.4 PG
MCHC RBC-ENTMCNC: 31.4 GM/DL
MCV RBC AUTO: 90.5 FL
MONOCYTES # BLD AUTO: 0.31 K/UL
MONOCYTES NFR BLD AUTO: 5.6 %
NEUTROPHILS # BLD AUTO: 3.1 K/UL
NEUTROPHILS NFR BLD AUTO: 55.7 %
PLATELET # BLD AUTO: 295 K/UL
RBC # BLD: 4.82 M/UL
RBC # FLD: 14.4 %
TSH SERPL-ACNC: 3.4 UIU/ML
WBC # FLD AUTO: 5.57 K/UL

## 2021-09-15 ENCOUNTER — NON-APPOINTMENT (OUTPATIENT)
Age: 51
End: 2021-09-15

## 2021-11-22 ENCOUNTER — ASOB RESULT (OUTPATIENT)
Age: 51
End: 2021-11-22

## 2021-11-22 ENCOUNTER — APPOINTMENT (OUTPATIENT)
Dept: OBGYN | Facility: CLINIC | Age: 51
End: 2021-11-22
Payer: MEDICAID

## 2021-11-22 VITALS
DIASTOLIC BLOOD PRESSURE: 70 MMHG | SYSTOLIC BLOOD PRESSURE: 110 MMHG | WEIGHT: 126 LBS | HEIGHT: 64 IN | BODY MASS INDEX: 21.51 KG/M2

## 2021-11-22 LAB
BILIRUB UR QL STRIP: NORMAL
CLARITY UR: CLEAR
COLLECTION METHOD: NORMAL
GLUCOSE UR-MCNC: NORMAL
HCG UR QL: 0.2 EU/DL
HGB UR QL STRIP.AUTO: ABNORMAL
KETONES UR-MCNC: NORMAL
LEUKOCYTE ESTERASE UR QL STRIP: NORMAL
NITRITE UR QL STRIP: NORMAL
PH UR STRIP: 5
PROT UR STRIP-MCNC: 30
SP GR UR STRIP: 1.01

## 2021-11-22 PROCEDURE — 99072 ADDL SUPL MATRL&STAF TM PHE: CPT

## 2021-11-22 PROCEDURE — 99213 OFFICE O/P EST LOW 20 MIN: CPT | Mod: 25

## 2021-11-22 PROCEDURE — 76830 TRANSVAGINAL US NON-OB: CPT

## 2021-11-22 PROCEDURE — 81003 URINALYSIS AUTO W/O SCOPE: CPT | Mod: QW

## 2021-11-22 RX ORDER — NAPROXEN 500 MG/1
500 TABLET ORAL
Refills: 0 | Status: DISCONTINUED | COMMUNITY
End: 2021-11-22

## 2021-11-22 RX ORDER — LEVOFLOXACIN 750 MG/1
TABLET, FILM COATED ORAL
Refills: 0 | Status: DISCONTINUED | COMMUNITY
End: 2021-11-22

## 2021-11-22 RX ORDER — OMEPRAZOLE 40 MG/1
40 CAPSULE, DELAYED RELEASE ORAL
Qty: 30 | Refills: 0 | Status: DISCONTINUED | COMMUNITY
Start: 2020-03-10 | End: 2021-11-22

## 2021-11-22 NOTE — PHYSICAL EXAM
[Appropriately responsive] : appropriately responsive [Alert] : alert [No Acute Distress] : no acute distress [Oriented x3] : oriented x3 [Labia Majora] : normal [Labia Minora] : normal [No Bleeding] : There was no active vaginal bleeding [IUD String] : an IUD string was noted [Normal] : normal [Uterine Adnexae] : normal

## 2021-11-22 NOTE — HISTORY OF PRESENT ILLNESS
[Currently Active] : currently active [Men] : men [Vaginal] : vaginal [No] : No [FreeTextEntry1] : \par Telma 51 y/o presents for cyst followup.\par \par 11/2021- she had mirena IUD placed 03/25/2021.\par \par 08/23/21 sono showed: anteverted uterus, endo thickness 7 mm with IUD seen in situ, normal appearing ovaries, previous Left Ovary cyst was no longer seen, clear Left para ovarian cyst was seen again, unchanged 2.4 cm, no free fluid seen. \par \par Reports she has been feeling an increase in urine frequency x 3 days. Denies pelvic pain.\par \par \par  [TextBox_4] : Liz [Mammogramdate] : 3/11/20 [TextBox_25] : br 2 [TextBox_19] : br 2    called for mammo [PapSmeardate] : 3/19/21 [TextBox_31] : Negative [ColonoscopyDate] : 20020 [TextBox_43] : WNL AS PER PT

## 2021-11-22 NOTE — REVIEW OF SYSTEMS
[Patient Intake Form Reviewed] : Patient intake form was reviewed [Urgency] : urgency [Frequency] : frequency [Dysuria] : dysuria [FreeTextEntry8] : pressure no pelvic pain

## 2021-11-22 NOTE — DISCUSSION/SUMMARY
[FreeTextEntry1] : \par Today's 11/22/21 TV scan with 3D to follow up ovarian cyst showed: anteverted uterus, endo thickness 7 mm with IUD in situ, normal appearing Right ovary, clear Left ovary cyst 1.4 x 1.2 x 1.1 cm, new, clear Left para ovary cyst again seen 2.2 x 2.0 x 2.0 cm, unchanged, no free fluid seen, bowel gas noted. We reviewed new left clear ovary cyst of 1.4 cm and the left simple paraovarian cyst of 2.2 cm seen on sono remains and did not change in size and will return in 3 months for cyst surveillance.\par \par Discussed cloudy urine seen in office and UA dipstick read trace blood. Will be sending urine for UA and urine culture; will follow up with results and tx prn. She denies any known drug allergies and hx of kidney disease. \par Advised to increase hydration.\par Patient concerns addressed, questions answered. patient verbalized understanding.\par \par rto 3 months for cyst surveillance with sono and prn.

## 2021-11-23 LAB
APPEARANCE: ABNORMAL
BACTERIA: NEGATIVE
BILIRUBIN URINE: NEGATIVE
BLOOD URINE: NEGATIVE
COLOR: YELLOW
GLUCOSE QUALITATIVE U: NEGATIVE
HYALINE CASTS: 3 /LPF
KETONES URINE: NEGATIVE
LEUKOCYTE ESTERASE URINE: NEGATIVE
MICROSCOPIC-UA: NORMAL
NITRITE URINE: NEGATIVE
PH URINE: 8.5
PROTEIN URINE: ABNORMAL
RED BLOOD CELLS URINE: 2 /HPF
SPECIFIC GRAVITY URINE: 1.02
SQUAMOUS EPITHELIAL CELLS: 4 /HPF
UROBILINOGEN URINE: NORMAL
WHITE BLOOD CELLS URINE: 1 /HPF

## 2021-11-24 ENCOUNTER — NON-APPOINTMENT (OUTPATIENT)
Age: 51
End: 2021-11-24

## 2021-11-24 LAB — BACTERIA UR CULT: NORMAL

## 2022-03-01 ENCOUNTER — APPOINTMENT (OUTPATIENT)
Dept: OBGYN | Facility: CLINIC | Age: 52
End: 2022-03-01
Payer: MEDICAID

## 2022-03-01 ENCOUNTER — ASOB RESULT (OUTPATIENT)
Age: 52
End: 2022-03-01

## 2022-03-01 ENCOUNTER — APPOINTMENT (OUTPATIENT)
Dept: ANTEPARTUM | Facility: CLINIC | Age: 52
End: 2022-03-01
Payer: MEDICAID

## 2022-03-01 VITALS
DIASTOLIC BLOOD PRESSURE: 56 MMHG | HEIGHT: 64 IN | BODY MASS INDEX: 21.85 KG/M2 | WEIGHT: 128 LBS | SYSTOLIC BLOOD PRESSURE: 94 MMHG

## 2022-03-01 DIAGNOSIS — R10.2 PELVIC AND PERINEAL PAIN: ICD-10-CM

## 2022-03-01 DIAGNOSIS — Z30.431 ENCOUNTER FOR ROUTINE CHECKING OF INTRAUTERINE CONTRACEPTIVE DEVICE: ICD-10-CM

## 2022-03-01 DIAGNOSIS — Z87.42 PERSONAL HISTORY OF OTHER DISEASES OF THE FEMALE GENITAL TRACT: ICD-10-CM

## 2022-03-01 DIAGNOSIS — Z30.430 ENCOUNTER FOR INSERTION OF INTRAUTERINE CONTRACEPTIVE DEVICE: ICD-10-CM

## 2022-03-01 DIAGNOSIS — Z13.29 ENCOUNTER FOR SCREENING FOR OTHER SUSPECTED ENDOCRINE DISORDER: ICD-10-CM

## 2022-03-01 PROCEDURE — 99072 ADDL SUPL MATRL&STAF TM PHE: CPT

## 2022-03-01 PROCEDURE — 76830 TRANSVAGINAL US NON-OB: CPT

## 2022-03-01 PROCEDURE — 99213 OFFICE O/P EST LOW 20 MIN: CPT

## 2022-03-05 PROBLEM — Z87.42 HISTORY OF ABNORMAL UTERINE BLEEDING: Status: RESOLVED | Noted: 2020-02-21 | Resolved: 2022-03-05

## 2022-03-05 PROBLEM — Z30.430 ENCOUNTER FOR INSERTION OF MIRENA IUD: Status: RESOLVED | Noted: 2021-03-25 | Resolved: 2022-03-05

## 2022-03-05 PROBLEM — R10.2 PELVIC PAIN IN FEMALE: Status: RESOLVED | Noted: 2020-03-03 | Resolved: 2022-03-05

## 2022-03-05 PROBLEM — Z13.29 SCREENING FOR THYROID DISORDER: Status: RESOLVED | Noted: 2021-08-23 | Resolved: 2022-03-05

## 2022-03-05 PROBLEM — Z30.431 SURVEILLANCE OF INTRAUTERINE CONTRACEPTIVE DEVICE: Status: RESOLVED | Noted: 2021-05-26 | Resolved: 2022-03-05

## 2022-03-05 PROBLEM — R10.2 PELVIC PRESSURE IN FEMALE: Status: RESOLVED | Noted: 2021-03-19 | Resolved: 2022-03-05

## 2022-03-05 PROBLEM — Z87.42 HISTORY OF MENORRHAGIA: Status: RESOLVED | Noted: 2021-08-23 | Resolved: 2022-03-05

## 2022-03-05 NOTE — HISTORY OF PRESENT ILLNESS
[LMP unknown] : LMP unknown [N] : Patient reports normal menses [IUD] : has an intrauterine device [Y] : Positive pregnancy history [unknown] : Patient is unsure of the date of her LMP [Menarche Age: ____] : age at menarche was [unfilled] [No] : Patient does not have concerns regarding sex [Currently Active] : currently active [Men] : men [FreeTextEntry1] : SIMONA 52 yo  LMP unknown is here today for a sonogram follow up. She is doing well with no gynecological complaints. \par \par Pelvic sonogram results reviewed with patient. Results showed: heterogenous uterus, endometrial lining measures 3.4 mm. IUD seen insitu. Ovaries are WNL, left adnexal para ovarian cyst measures 2.39 x 1.84 x 1.96 cm. No free fluid noted.  [TextBox_4] : Pt presents for a f/u of pelvic sono [Mammogramdate] : 03/11/2020 [TextBox_19] : BR2 [BreastSonogramDate] : 03/11/2020 [TextBox_25] : BR2 [PapSmeardate] : 03/19/2021 [TextBox_31] : NORMAL [HPVDate] : 03/19/2021 [TextBox_78] : NEG [de-identified] : MIRENA [PGHxTotal] : 2 [[facility]iving] : 1 [[facility]xFul[facility]] : 1 [PGHxABSpont] : 1

## 2022-03-05 NOTE — DISCUSSION/SUMMARY
[FreeTextEntry1] : Advised she only needs to have a pelvic sonogram moving forward every year. \par \par She will follow up for annual exam and as needed.

## 2022-03-05 NOTE — END OF VISIT
[FreeTextEntry3] : I, Jaz Rodriguez solely acted as scribe for Dr. Emelyn Liao on 03/01/2022 \par All medical entries made by the Scribe were at my, Dr. Liao’s, direction and personally\par dictated by me on 03/01/2022 . I have reviewed the chart and agree that the record\par accurately reflects my personal performance of the history, physical exam, assessment and plan. I\par have also personally directed, reviewed, and agreed with the chart.

## 2022-04-05 ENCOUNTER — APPOINTMENT (OUTPATIENT)
Dept: OBGYN | Facility: CLINIC | Age: 52
End: 2022-04-05
Payer: MEDICAID

## 2022-04-05 VITALS
BODY MASS INDEX: 22.02 KG/M2 | HEIGHT: 64 IN | SYSTOLIC BLOOD PRESSURE: 104 MMHG | DIASTOLIC BLOOD PRESSURE: 62 MMHG | WEIGHT: 129 LBS

## 2022-04-05 LAB
BILIRUB UR QL STRIP: NEGATIVE
DATE COLLECTED: NORMAL
GLUCOSE UR-MCNC: NEGATIVE
HCG UR QL: 0.2 EU/DL
HEMOCCULT SP1 STL QL: NEGATIVE
HGB UR QL STRIP.AUTO: NEGATIVE
KETONES UR-MCNC: NEGATIVE
LEUKOCYTE ESTERASE UR QL STRIP: NEGATIVE
NITRITE UR QL STRIP: NEGATIVE
PH UR STRIP: 7
PROT UR STRIP-MCNC: NEGATIVE
QUALITY CONTROL: YES
SP GR UR STRIP: 1.01

## 2022-04-05 PROCEDURE — 99214 OFFICE O/P EST MOD 30 MIN: CPT | Mod: 25

## 2022-04-05 PROCEDURE — 81003 URINALYSIS AUTO W/O SCOPE: CPT | Mod: QW

## 2022-04-05 PROCEDURE — 82270 OCCULT BLOOD FECES: CPT

## 2022-04-05 PROCEDURE — 99396 PREV VISIT EST AGE 40-64: CPT

## 2022-04-05 NOTE — PHYSICAL EXAM
[Chaperone Present] : A chaperone was present in the examining room during all aspects of the physical examination [Appropriately responsive] : appropriately responsive [Alert] : alert [No Acute Distress] : no acute distress [Soft] : soft [Non-tender] : non-tender [Non-distended] : non-distended [Oriented x3] : oriented x3 [Examination Of The Breasts] : a normal appearance [No Discharge] : no discharge [No Masses] : no breast masses were palpable [Labia Majora] : normal [Labia Minora] : normal [IUD String] : an IUD string was noted [Normal] : normal [Uterine Adnexae] : normal [FreeTextEntry1] : LANDEN Rice [] : implants

## 2022-04-05 NOTE — REVIEW OF SYSTEMS
[Dysuria] : dysuria [Negative] : Heme/Lymph [Patient Intake Form Reviewed] : Patient intake form was reviewed [Abn Vaginal bleeding] : abnormal vaginal bleeding [Genital Rash/Irritation] : genital rash/irritation

## 2022-04-05 NOTE — DISCUSSION/SUMMARY
[FreeTextEntry1] : Pap done today.\par Prescription for mammogram screening and breast US given.\par Self-breast exam reviewed. Bilateral implants noted.  \par Sonogram ordered secondary to vaginal pain. Informed patient pain may be due to IUD placement. \par Hormonal blood panel collected today. \par Bacterial vaginosis panel collected today. \par Urine culture collected. \par She will follow up with an in office Hysteroscopy in 2-3 weeks secondary to spotting. She will premedicate with Motrin. \par \par All questions and concerns were discussed. \par

## 2022-04-05 NOTE — HISTORY OF PRESENT ILLNESS
I am not the prescriber of his duloxetine.    Thaddeus    [HPV test offered] : HPV test offered [Menarche Age: ____] : age at menarche was [unfilled] [No] : Patient does not have concerns regarding sex [LMP unknown] : LMP unknown [IUD] : has an intrauterine device [Y] : Patient is sexually active [Currently Active] : currently active [FreeTextEntry1] : Ms. NEGRO 51 year old  LMP: Unknown presents to the office today for her annual exam. She reports she has been going through a rough time as her son had an appendectomy but otherwise she is doing well. \par \par She complains of vaginal pain when she is urinating. The pain persists for hours and only occurs occasionally. She uses hot compresses and takes Tylenol but the pain always returns. She reports spotting for 2 weeks which is minimal but constant.\par \par Her last pap smear was in 21 and was negative, HPV negative.\par Her last mammogram and breast sonogram was in 03/10/20 and revealed Bi-Rads2. \par \par \par  [TextBox_4] : Pt presents for her annual exam\par : Kirstie ID # [id number] [Mammogramdate] : 03/10/21 [TextBox_19] : br2 [BreastSonogramDate] : 03/10/21 [TextBox_25] : br2 [PapSmeardate] : 03/19/21 [TextBox_31] : neg [ColonoscopyDate] : 2020 [TextBox_43] : per pt [HPVDate] : 03/19/21 [TextBox_78] : neg [de-identified] : Mirena IUD placed in 03/23/2021 [PGHxTotal] : 2 [[facility] PhoenixxFulerm] : 1 [[facility] Hospital[facility]] : 1 [PGHxABSpont] : 1

## 2022-04-05 NOTE — END OF VISIT
[FreeTextEntry3] : I, Sena Nicolas solely acted as a scribe on behalf of  on 04/05/2022. All medical entries made by the scribe were at my, Dr. Liao, direction and personally dictated by me on 04/05/2022 . I have reviewed the chart and agree that the record accurately reflects my personal performance of the history, physical exam, assessment and plan. I have also personally directed, reviewed and agreed with the chart.\par

## 2022-04-07 LAB
BACTERIA UR CULT: NORMAL
BASOPHILS # BLD AUTO: 0.02 K/UL
BASOPHILS NFR BLD AUTO: 0.4 %
EOSINOPHIL # BLD AUTO: 0.15 K/UL
EOSINOPHIL NFR BLD AUTO: 2.9 %
FSH SERPL-MCNC: 4.1 IU/L
HCT VFR BLD CALC: 42.4 %
HGB BLD-MCNC: 13.3 G/DL
HPV HIGH+LOW RISK DNA PNL CVX: NOT DETECTED
IMM GRANULOCYTES NFR BLD AUTO: 0.2 %
LH SERPL-ACNC: 4.3 IU/L
LYMPHOCYTES # BLD AUTO: 1.99 K/UL
LYMPHOCYTES NFR BLD AUTO: 37.8 %
MAN DIFF?: NORMAL
MCHC RBC-ENTMCNC: 28 PG
MCHC RBC-ENTMCNC: 31.4 GM/DL
MCV RBC AUTO: 89.3 FL
MONOCYTES # BLD AUTO: 0.37 K/UL
MONOCYTES NFR BLD AUTO: 7 %
NEUTROPHILS # BLD AUTO: 2.72 K/UL
NEUTROPHILS NFR BLD AUTO: 51.7 %
PLATELET # BLD AUTO: 259 K/UL
PROLACTIN SERPL-MCNC: 13 NG/ML
RBC # BLD: 4.75 M/UL
RBC # FLD: 13.5 %
TSH SERPL-ACNC: 5.18 UIU/ML
WBC # FLD AUTO: 5.26 K/UL

## 2022-04-12 LAB — CYTOLOGY CVX/VAG DOC THIN PREP: NORMAL

## 2022-04-14 ENCOUNTER — APPOINTMENT (OUTPATIENT)
Dept: OBGYN | Facility: CLINIC | Age: 52
End: 2022-04-14
Payer: MEDICAID

## 2022-04-14 ENCOUNTER — ASOB RESULT (OUTPATIENT)
Age: 52
End: 2022-04-14

## 2022-04-14 ENCOUNTER — APPOINTMENT (OUTPATIENT)
Dept: ANTEPARTUM | Facility: CLINIC | Age: 52
End: 2022-04-14
Payer: MEDICAID

## 2022-04-14 VITALS — BODY MASS INDEX: 22.02 KG/M2 | WEIGHT: 129 LBS | HEIGHT: 64 IN

## 2022-04-14 DIAGNOSIS — Z87.898 PERSONAL HISTORY OF OTHER SPECIFIED CONDITIONS: ICD-10-CM

## 2022-04-14 DIAGNOSIS — Z12.11 ENCOUNTER FOR SCREENING FOR MALIGNANT NEOPLASM OF COLON: ICD-10-CM

## 2022-04-14 LAB
HCG UR QL: NEGATIVE
QUALITY CONTROL: YES

## 2022-04-14 PROCEDURE — 76830 TRANSVAGINAL US NON-OB: CPT

## 2022-04-14 PROCEDURE — 57500 BIOPSY OF CERVIX: CPT

## 2022-04-14 PROCEDURE — 81025 URINE PREGNANCY TEST: CPT

## 2022-04-14 PROCEDURE — 58558Z: CUSTOM

## 2022-04-14 NOTE — DISCUSSION/SUMMARY
[FreeTextEntry1] : Consent was obtained. \par Difficult to fully visualize given menses. No obvious polyps. IUD appears to be in situ. \par Patient is aware of the possibilities of existing polyps. \par Advised IUD may be the reason for AUB. \par Advised patient to avoid anything in the vagina for a week to avoid infection. \par She will follow up in 2-3 weeks for results. \par \par All questions and concerns were discussed.

## 2022-04-14 NOTE — HISTORY OF PRESENT ILLNESS
[TextBox_4] : PT PRESENTS TODAY FOR A SONO HIST [FreeTextEntry1] : Ms. NEGRO 51 year old  presents to the office today for a hysteroscopy procedure secondary to spotting and thickened endometrium with possible polyp on sono. . She is doing well. Consent was obtained. \par \par Transvaginal sono performed due to pelvic pain, check IUD placement. \par The uterus is anteverted and slightly heterogenous. Appearance suggestive of adenomyosis, posteriorly. \par The endometrial thickness is 5 mm with IUD in situ. \par There is fluid in the endocervical canal with a hyperechoic foci measuring 1.3 x 0.4 x 0.4 cm, polyp can not be ruled out. \par Normal appearing ovaries are seen. \par A clear LT para ovary cyst is seen measuring 2.2 x 2.2. x 2.0 cm, unchanged. \par No free fluid seen. \par Extensive bowel gas noted. \par

## 2022-04-14 NOTE — PHYSICAL EXAM
[Chaperone Present] : A chaperone was present in the examining room during all aspects of the physical examination [Appropriately responsive] : appropriately responsive [Alert] : alert [No Acute Distress] : no acute distress [Oriented x3] : oriented x3 [IUD String] : an IUD string was noted [FreeTextEntry1] : LANDEN Fonseca

## 2022-04-14 NOTE — PROCEDURE
[Hysteroscopy] : Hysteroscopy [Time out performed] : Pre-procedure time out performed.  Patient's name, date of birth and procedure confirmed. [Consent Obtained] : Consent obtained [Risks] : risks [Benefits] : benefits [Patient] : patient [Infection] : infection [Bleeding] : bleeding [Allergic Reaction] : allergic reaction [Sent to Pathology] : specimen was placed in buffered formalin and sent for pathology [Hemostasis obtained] : hemostasis obtained [Tolerated Well] : Patient tolerated the procedure well

## 2022-04-14 NOTE — END OF VISIT
[FreeTextEntry3] : I, Sena Nicolas solely acted as a scribe on behalf of  on 04/14/2022. All medical entries made by the scribe were at my, Dr. Liao, direction and personally dictated by me on 04/14/2022 . I have reviewed the chart and agree that the record accurately reflects my personal performance of the history, physical exam, assessment and plan. I have also personally directed, reviewed and agreed with the chart.\par

## 2022-04-28 ENCOUNTER — APPOINTMENT (OUTPATIENT)
Dept: ULTRASOUND IMAGING | Facility: CLINIC | Age: 52
End: 2022-04-28
Payer: MEDICAID

## 2022-04-28 ENCOUNTER — OUTPATIENT (OUTPATIENT)
Dept: OUTPATIENT SERVICES | Facility: HOSPITAL | Age: 52
LOS: 1 days | End: 2022-04-28
Payer: COMMERCIAL

## 2022-04-28 ENCOUNTER — RESULT REVIEW (OUTPATIENT)
Age: 52
End: 2022-04-28

## 2022-04-28 ENCOUNTER — APPOINTMENT (OUTPATIENT)
Dept: MAMMOGRAPHY | Facility: CLINIC | Age: 52
End: 2022-04-28
Payer: MEDICAID

## 2022-04-28 ENCOUNTER — NON-APPOINTMENT (OUTPATIENT)
Age: 52
End: 2022-04-28

## 2022-04-28 DIAGNOSIS — R10.2 PELVIC AND PERINEAL PAIN: ICD-10-CM

## 2022-04-28 DIAGNOSIS — R92.2 INCONCLUSIVE MAMMOGRAM: ICD-10-CM

## 2022-04-28 PROCEDURE — 77063 BREAST TOMOSYNTHESIS BI: CPT | Mod: 26

## 2022-04-28 PROCEDURE — 76641 ULTRASOUND BREAST COMPLETE: CPT | Mod: 26,50

## 2022-04-28 PROCEDURE — 76641 ULTRASOUND BREAST COMPLETE: CPT

## 2022-04-28 PROCEDURE — 77067 SCR MAMMO BI INCL CAD: CPT

## 2022-04-28 PROCEDURE — 77063 BREAST TOMOSYNTHESIS BI: CPT

## 2022-04-28 PROCEDURE — 77067 SCR MAMMO BI INCL CAD: CPT | Mod: 26

## 2022-05-03 ENCOUNTER — RESULT REVIEW (OUTPATIENT)
Age: 52
End: 2022-05-03

## 2022-05-03 ENCOUNTER — APPOINTMENT (OUTPATIENT)
Dept: OBGYN | Facility: CLINIC | Age: 52
End: 2022-05-03
Payer: MEDICAID

## 2022-05-03 ENCOUNTER — LABORATORY RESULT (OUTPATIENT)
Age: 52
End: 2022-05-03

## 2022-05-03 VITALS
WEIGHT: 127 LBS | DIASTOLIC BLOOD PRESSURE: 60 MMHG | SYSTOLIC BLOOD PRESSURE: 102 MMHG | HEIGHT: 64 IN | BODY MASS INDEX: 21.68 KG/M2

## 2022-05-03 DIAGNOSIS — N84.1 POLYP OF CERVIX UTERI: ICD-10-CM

## 2022-05-03 LAB
CORE LAB BIOPSY: NORMAL
HCG UR QL: NEGATIVE
QUALITY CONTROL: YES

## 2022-05-03 PROCEDURE — 58301 REMOVE INTRAUTERINE DEVICE: CPT

## 2022-05-03 PROCEDURE — 99214 OFFICE O/P EST MOD 30 MIN: CPT | Mod: 25

## 2022-05-03 PROCEDURE — 81025 URINE PREGNANCY TEST: CPT

## 2022-05-03 NOTE — PROCEDURE
[IUD Removal] : intrauterine device (IUD) removal [Hysteroscopic Retrieval of IUD] : hysteroscopic retrieval [Time out performed] : Pre-procedure time out performed.  Patient's name, date of birth and procedure confirmed. [Consent Obtained] : Consent obtained [Infection] : infection [Risks] : risks [Benefits] : benefits [Patient] : patient [Speculum Placed] : speculum placed [Cultured] : specimen sent for cultures [Tolerated Well] : Patient tolerated the procedure well [No Complications] : no complications [Hemostatis Obtained] : hemostatis obtained

## 2022-05-07 NOTE — DISCUSSION/SUMMARY
[FreeTextEntry1] : IUD was removed and sent out for culture. \par US breast ordered. \par -Doxycycline prescribed and sent to her pharmacy. \par -Ibuprofen prescribed and sent to her pharmacy. \par -MedroxyPROGESTERone acetate prescribed and sent to her pharmacy. \par All questions and concerns were discussed. \par \par She will follow up in 3 months for a repeat sono or as needed.

## 2022-05-07 NOTE — PHYSICAL EXAM
[Chaperone Present] : A chaperone was present in the examining room during all aspects of the physical examination [Appropriately responsive] : appropriately responsive [Alert] : alert [No Acute Distress] : no acute distress [Oriented x3] : oriented x3 [FreeTextEntry1] : LANDEN AGUIAR

## 2022-05-07 NOTE — HISTORY OF PRESENT ILLNESS
[N] : Patient reports normal menses [IUD] : has an intrauterine device [Y] : Positive pregnancy history [Menarche Age: ____] : age at menarche was [unfilled] [Currently Active] : currently active [Mammogramdate] : 04/28/22 [TextBox_19] : BR3 [BreastSonogramDate] : 04/28/22 [TextBox_25] : BR3 [PapSmeardate] : 04/05/22 [TextBox_31] : NEG [HPVDate] : 04/05/22 [TextBox_78] : NEG [LMPDate] : 04/13/22 [de-identified] : MIRENA [PGHxTotal] : 2 [[facility]] : 1 [[facility]] : 1 [PGHxABSpont] : 1 [FreeTextEntry1] : 04/13/22

## 2022-05-07 NOTE — REVIEW OF SYSTEMS
[Patient Intake Form Reviewed] : Patient intake form was reviewed [Abn Vaginal bleeding] : abnormal vaginal bleeding [Pelvic pain] : pelvic pain

## 2022-08-10 ENCOUNTER — APPOINTMENT (OUTPATIENT)
Dept: ANTEPARTUM | Facility: CLINIC | Age: 52
End: 2022-08-10

## 2022-08-10 ENCOUNTER — ASOB RESULT (OUTPATIENT)
Age: 52
End: 2022-08-10

## 2022-08-10 ENCOUNTER — APPOINTMENT (OUTPATIENT)
Dept: OBGYN | Facility: CLINIC | Age: 52
End: 2022-08-10

## 2022-08-10 VITALS
SYSTOLIC BLOOD PRESSURE: 110 MMHG | WEIGHT: 127 LBS | TEMPERATURE: 97 F | BODY MASS INDEX: 21.68 KG/M2 | HEIGHT: 64 IN | DIASTOLIC BLOOD PRESSURE: 70 MMHG

## 2022-08-10 DIAGNOSIS — Z97.5 PRESENCE OF (INTRAUTERINE) CONTRACEPTIVE DEVICE: ICD-10-CM

## 2022-08-10 DIAGNOSIS — Z87.42 PERSONAL HISTORY OF OTHER DISEASES OF THE FEMALE GENITAL TRACT: ICD-10-CM

## 2022-08-10 PROCEDURE — 76830 TRANSVAGINAL US NON-OB: CPT

## 2022-08-10 PROCEDURE — 99212 OFFICE O/P EST SF 10 MIN: CPT

## 2022-08-15 NOTE — DISCUSSION/SUMMARY
[FreeTextEntry1] : Pelvic sono reviewed. No further up imaging required at this time.\par \par Patient made aware to call the office should she have AUB\par \par She will follow up in 4/2023 for her annual exam or prn

## 2022-08-15 NOTE — HISTORY OF PRESENT ILLNESS
[N] : Patient does not use contraception [Y] : Positive pregnancy history [Menarche Age: ____] : age at menarche was [unfilled] [No] : Patient does not have concerns regarding sex [Currently Active] : currently active [Mammogramdate] : 04/28/22 [TextBox_19] : BR3 [BreastSonogramDate] : 04/28/22 [TextBox_25] : BR3 [PapSmeardate] : 04/05/22 [TextBox_31] : NEG [HPVDate] : 04/05/22 [TextBox_78] : NEG [LMPDate] : 08/05/22 [PGHxTotal] : 2 [[facility]] : 1 [[facility]iving] : 1 [PGHxABSpont] : 1 [FreeTextEntry1] : 08/05/22

## 2022-08-15 NOTE — PHYSICAL EXAM
[Chaperone Present] : A chaperone was present in the examining room during all aspects of the physical examination [Appropriately responsive] : appropriately responsive [Alert] : alert [No Acute Distress] : no acute distress [Oriented x3] : oriented x3 [FreeTextEntry1] : Angelo

## 2022-08-15 NOTE — END OF VISIT
[FreeTextEntry3] : I, Teresa Ignacio, solely acted as a scribe for Dr. Emelyn Liao on 08/10/2022 . All medical entries made by the Scribe were at my, Dr. Liao's, direction and personally dictated by me on 08/10/2022. I have reviewed the chart and agree that the record accurately reflects my personal performance of the history, physical exam, assessment and plan. I have also personally directed, reviewed and agreed with the chart.

## 2022-10-28 ENCOUNTER — NON-APPOINTMENT (OUTPATIENT)
Age: 52
End: 2022-10-28

## 2022-10-28 ENCOUNTER — APPOINTMENT (OUTPATIENT)
Dept: ULTRASOUND IMAGING | Facility: CLINIC | Age: 52
End: 2022-10-28

## 2022-10-28 ENCOUNTER — OUTPATIENT (OUTPATIENT)
Dept: OUTPATIENT SERVICES | Facility: HOSPITAL | Age: 52
LOS: 1 days | End: 2022-10-28
Payer: MEDICAID

## 2022-10-28 ENCOUNTER — APPOINTMENT (OUTPATIENT)
Dept: MAMMOGRAPHY | Facility: CLINIC | Age: 52
End: 2022-10-28

## 2022-10-28 ENCOUNTER — RESULT REVIEW (OUTPATIENT)
Age: 52
End: 2022-10-28

## 2022-10-28 DIAGNOSIS — R92.8 OTHER ABNORMAL AND INCONCLUSIVE FINDINGS ON DIAGNOSTIC IMAGING OF BREAST: ICD-10-CM

## 2022-10-28 DIAGNOSIS — Z00.8 ENCOUNTER FOR OTHER GENERAL EXAMINATION: ICD-10-CM

## 2022-10-28 PROCEDURE — 76642 ULTRASOUND BREAST LIMITED: CPT | Mod: 26,RT

## 2022-10-28 PROCEDURE — 76642 ULTRASOUND BREAST LIMITED: CPT

## 2022-11-30 ENCOUNTER — APPOINTMENT (OUTPATIENT)
Dept: OBGYN | Facility: CLINIC | Age: 52
End: 2022-11-30

## 2022-11-30 ENCOUNTER — RESULT REVIEW (OUTPATIENT)
Age: 52
End: 2022-11-30

## 2022-11-30 VITALS
BODY MASS INDEX: 21.51 KG/M2 | WEIGHT: 126 LBS | DIASTOLIC BLOOD PRESSURE: 67 MMHG | HEIGHT: 64 IN | SYSTOLIC BLOOD PRESSURE: 111 MMHG

## 2022-11-30 PROCEDURE — 99214 OFFICE O/P EST MOD 30 MIN: CPT

## 2022-11-30 NOTE — PHYSICAL EXAM
[Appropriately responsive] : appropriately responsive [Alert] : alert [No Acute Distress] : no acute distress [Mass] : mass [Oriented x3] : oriented x3 [Examination Of The Breasts] : a normal appearance [Normal] : normal [Breast Mass Left Breast ___cm] : no mass was palpable

## 2022-11-30 NOTE — DISCUSSION/SUMMARY
[FreeTextEntry1] : Right breast mass palpated on exam, firm and mobile. RX for diagnostic breast imaging provided. Track placed for results. WIll call pt to review. Encouraged NSAIDs and heat/cold packs as needed for discomfort, wearing supportive bras. \par \par F/u as needed, all questions addressed

## 2022-11-30 NOTE — HISTORY OF PRESENT ILLNESS
[Menarche Age: ____] : age at menarche was [unfilled] [No] : Patient does not have concerns regarding sex [N] : Patient does not use contraception [Y] : Patient is sexually active [Currently Active] : currently active [Mammogramdate] : 04/28/22 [TextBox_19] : br3 [BreastSonogramDate] : 04/28/22 [TextBox_25] : br3 [PapSmeardate] : 04/05/22 [TextBox_31] : neg [HPVDate] : 04/25/22 [TextBox_78] : neg [LMPDate] : 11/12/22 [PGHxTotal] : 2 [[facility]] : 1 [[facility] Hospitaliving] : 1 [PGHxABSpont] : 1 [Palpable Lump] : palpable lump [TextBox_17] : right breast outter lower quadrant  [FreeTextEntry1] : 11/12/22

## 2022-12-30 ENCOUNTER — RESULT REVIEW (OUTPATIENT)
Age: 52
End: 2022-12-30

## 2022-12-30 ENCOUNTER — OUTPATIENT (OUTPATIENT)
Dept: OUTPATIENT SERVICES | Facility: HOSPITAL | Age: 52
LOS: 1 days | End: 2022-12-30
Payer: MEDICAID

## 2022-12-30 ENCOUNTER — APPOINTMENT (OUTPATIENT)
Dept: ULTRASOUND IMAGING | Facility: CLINIC | Age: 52
End: 2022-12-30
Payer: MEDICAID

## 2022-12-30 ENCOUNTER — APPOINTMENT (OUTPATIENT)
Dept: MAMMOGRAPHY | Facility: CLINIC | Age: 52
End: 2022-12-30
Payer: MEDICAID

## 2022-12-30 DIAGNOSIS — N63.0 UNSPECIFIED LUMP IN UNSPECIFIED BREAST: ICD-10-CM

## 2022-12-30 PROCEDURE — G0279: CPT | Mod: 26

## 2022-12-30 PROCEDURE — 77065 DX MAMMO INCL CAD UNI: CPT | Mod: 26,RT

## 2022-12-30 PROCEDURE — G0279: CPT

## 2022-12-30 PROCEDURE — 76642 ULTRASOUND BREAST LIMITED: CPT | Mod: 26,RT

## 2022-12-30 PROCEDURE — 76642 ULTRASOUND BREAST LIMITED: CPT

## 2022-12-30 PROCEDURE — 77065 DX MAMMO INCL CAD UNI: CPT

## 2023-01-03 ENCOUNTER — NON-APPOINTMENT (OUTPATIENT)
Age: 53
End: 2023-01-03

## 2023-04-11 ENCOUNTER — RESULT REVIEW (OUTPATIENT)
Age: 53
End: 2023-04-11

## 2023-04-11 ENCOUNTER — APPOINTMENT (OUTPATIENT)
Dept: OBGYN | Facility: CLINIC | Age: 53
End: 2023-04-11
Payer: MEDICAID

## 2023-04-11 VITALS
SYSTOLIC BLOOD PRESSURE: 116 MMHG | DIASTOLIC BLOOD PRESSURE: 68 MMHG | WEIGHT: 131.4 LBS | HEIGHT: 64 IN | BODY MASS INDEX: 22.43 KG/M2

## 2023-04-11 DIAGNOSIS — R93.89 ABNORMAL FINDINGS ON DIAGNOSTIC IMAGING OF OTHER SPECIFIED BODY STRUCTURES: ICD-10-CM

## 2023-04-11 DIAGNOSIS — Z12.31 ENCOUNTER FOR SCREENING MAMMOGRAM FOR MALIGNANT NEOPLASM OF BREAST: ICD-10-CM

## 2023-04-11 DIAGNOSIS — R92.2 INCONCLUSIVE MAMMOGRAM: ICD-10-CM

## 2023-04-11 PROCEDURE — 57500 BIOPSY OF CERVIX: CPT

## 2023-04-11 PROCEDURE — 99214 OFFICE O/P EST MOD 30 MIN: CPT | Mod: 25

## 2023-04-11 PROCEDURE — 99396 PREV VISIT EST AGE 40-64: CPT

## 2023-04-11 RX ORDER — DOXYCYCLINE HYCLATE 100 MG/1
100 TABLET ORAL
Qty: 14 | Refills: 0 | Status: DISCONTINUED | COMMUNITY
Start: 2022-05-03 | End: 2023-04-11

## 2023-04-11 RX ORDER — MEDROXYPROGESTERONE ACETATE 10 MG/1
10 TABLET ORAL DAILY
Qty: 30 | Refills: 2 | Status: DISCONTINUED | COMMUNITY
Start: 2022-05-03 | End: 2023-04-11

## 2023-04-12 LAB
BASOPHILS # BLD AUTO: 0.05 K/UL
BASOPHILS NFR BLD AUTO: 1 %
EOSINOPHIL # BLD AUTO: 0.19 K/UL
EOSINOPHIL NFR BLD AUTO: 4 %
FSH SERPL-MCNC: 5.4 IU/L
HCT VFR BLD CALC: 35.3 %
HGB BLD-MCNC: 10.9 G/DL
IMM GRANULOCYTES NFR BLD AUTO: 0.2 %
LH SERPL-ACNC: 3.7 IU/L
LYMPHOCYTES # BLD AUTO: 2.05 K/UL
LYMPHOCYTES NFR BLD AUTO: 42.8 %
MAN DIFF?: NORMAL
MCHC RBC-ENTMCNC: 24.8 PG
MCHC RBC-ENTMCNC: 30.9 GM/DL
MCV RBC AUTO: 80.4 FL
MONOCYTES # BLD AUTO: 0.43 K/UL
MONOCYTES NFR BLD AUTO: 9 %
NEUTROPHILS # BLD AUTO: 2.06 K/UL
NEUTROPHILS NFR BLD AUTO: 43 %
PLATELET # BLD AUTO: 292 K/UL
PROLACTIN SERPL-MCNC: 12.8 NG/ML
RBC # BLD: 4.39 M/UL
RBC # FLD: 15.7 %
TSH SERPL-ACNC: 3.55 UIU/ML
WBC # FLD AUTO: 4.79 K/UL

## 2023-04-12 NOTE — HISTORY OF PRESENT ILLNESS
[HPV test offered] : HPV test offered [Y] : Positive pregnancy history [Menarche Age: ____] : age at menarche was [unfilled] [No] : Patient does not have concerns regarding sex [Currently Active] : currently active [TextBox_4] : Pt is here for her annual exam [Mammogramdate] : 4/28/22 [TextBox_19] : br3 [BreastSonogramDate] : 4/28/22 [TextBox_25] : br3 [PapSmeardate] : 4/58/22 [TextBox_31] : neg [HPVDate] : 4/5/22 [TextBox_78] : neg [LMPDate] : 3/21/23 [PGxTotal] : 1 [[facility]Fulerm] : 1 [[facility]iving] : 1 [PGHxABSpont] : 1 [FreeTextEntry1] : 3/21/23

## 2023-04-12 NOTE — END OF VISIT
[FreeTextEntry3] : I, Jaz Rodriguez solely acted as scribe for Dr. Emelyn Laio on 04/11/2023 \par All medical entries made by the Scribe were at my, Dr. Liao’s, direction and personally\par dictated by me on 04/11/2023 . I have reviewed the chart and agree that the record\par accurately reflects my personal performance of the history, physical exam, assessment and plan. I\par have also personally directed, reviewed, and agreed with the chart.

## 2023-04-12 NOTE — DISCUSSION/SUMMARY
[FreeTextEntry1] : Differential diagnosis of dysfunctional uterine bleeding discussed at length including structural, hormonal, and malignant causes. \par \par 1. Hormone panel was drawn today to assess for hormonal causes. We will review lab results at her next visit. \par \par 2. A pelvic ultrasound was ordered as well. \par \par 3. We also discussed the need for hysteroscopy with biopsy to R/O structural and malignant causes. The procedure was discussed in detail. She will premedicate with prescription strength ibuprofen as motrin does not work for her.\par \par Two weeks after her above work up is completed, she will return to discuss all results and discuss a treatment plan.\par \par Pap done today. \par \par Cervical polyp noted and removed using a Kevorkian. Specimen sent to pathology.\par \par B/L breast nodules palpated. Rx given for diagnostic imaging. Referral for a breast specialist given secondary to breast exam findings\par \par Prescription for mammogram screening and breast US given. Prescription for a bilateral diagnostic breast US given.\par \par Self-breast exam reviewed.\par \par F/u for pelvic sono and hyst or as needed.

## 2023-04-12 NOTE — PHYSICAL EXAM
[Chaperone Present] : A chaperone was present in the examining room during all aspects of the physical examination [Appropriately responsive] : appropriately responsive [Alert] : alert [No Acute Distress] : no acute distress [Soft] : soft [Non-tender] : non-tender [Non-distended] : non-distended [Oriented x3] : oriented x3 [Examination Of The Breasts] : a normal appearance [] : implants [No Discharge] : no discharge [No Masses] : no breast masses were palpable [Labia Majora] : normal [Labia Minora] : normal [No Bleeding] : There was no active vaginal bleeding [Polyp ___ cm] : [unfilled] ~[facility] polyp [Normal] : normal [Uterine Adnexae] : normal [FreeTextEntry6] : Under implant, laterally left breast 2 x 2 cm nodule at 9 o'clock. Tender to touch. RT breast 2 x 2 cm laterally at 11 o'clock.

## 2023-04-16 LAB
CYTOLOGY CVX/VAG DOC THIN PREP: ABNORMAL
HPV HIGH+LOW RISK DNA PNL CVX: NOT DETECTED

## 2023-04-26 ENCOUNTER — OUTPATIENT (OUTPATIENT)
Dept: OUTPATIENT SERVICES | Facility: HOSPITAL | Age: 53
LOS: 1 days | End: 2023-04-26
Payer: MEDICAID

## 2023-04-26 ENCOUNTER — APPOINTMENT (OUTPATIENT)
Dept: MAMMOGRAPHY | Facility: CLINIC | Age: 53
End: 2023-04-26
Payer: MEDICAID

## 2023-04-26 ENCOUNTER — RESULT REVIEW (OUTPATIENT)
Age: 53
End: 2023-04-26

## 2023-04-26 ENCOUNTER — APPOINTMENT (OUTPATIENT)
Dept: ULTRASOUND IMAGING | Facility: CLINIC | Age: 53
End: 2023-04-26
Payer: MEDICAID

## 2023-04-26 DIAGNOSIS — R92.2 INCONCLUSIVE MAMMOGRAM: ICD-10-CM

## 2023-04-26 DIAGNOSIS — R92.8 OTHER ABNORMAL AND INCONCLUSIVE FINDINGS ON DIAGNOSTIC IMAGING OF BREAST: ICD-10-CM

## 2023-04-26 DIAGNOSIS — Z00.8 ENCOUNTER FOR OTHER GENERAL EXAMINATION: ICD-10-CM

## 2023-04-26 LAB — CORE LAB BIOPSY: NORMAL

## 2023-04-26 PROCEDURE — 77066 DX MAMMO INCL CAD BI: CPT

## 2023-04-26 PROCEDURE — 77066 DX MAMMO INCL CAD BI: CPT | Mod: 26

## 2023-04-26 PROCEDURE — G0279: CPT | Mod: 26

## 2023-04-26 PROCEDURE — 76641 ULTRASOUND BREAST COMPLETE: CPT

## 2023-04-26 PROCEDURE — G0279: CPT

## 2023-04-26 PROCEDURE — 76641 ULTRASOUND BREAST COMPLETE: CPT | Mod: 26,50

## 2023-05-03 ENCOUNTER — ASOB RESULT (OUTPATIENT)
Age: 53
End: 2023-05-03

## 2023-05-03 ENCOUNTER — APPOINTMENT (OUTPATIENT)
Dept: ANTEPARTUM | Facility: CLINIC | Age: 53
End: 2023-05-03
Payer: COMMERCIAL

## 2023-05-03 ENCOUNTER — APPOINTMENT (OUTPATIENT)
Dept: OBGYN | Facility: CLINIC | Age: 53
End: 2023-05-03
Payer: COMMERCIAL

## 2023-05-03 VITALS
WEIGHT: 131 LBS | HEIGHT: 64 IN | SYSTOLIC BLOOD PRESSURE: 102 MMHG | DIASTOLIC BLOOD PRESSURE: 60 MMHG | BODY MASS INDEX: 22.36 KG/M2

## 2023-05-03 DIAGNOSIS — D64.9 ANEMIA, UNSPECIFIED: ICD-10-CM

## 2023-05-03 DIAGNOSIS — N63.0 UNSPECIFIED LUMP IN UNSPECIFIED BREAST: ICD-10-CM

## 2023-05-03 DIAGNOSIS — Z01.419 ENCOUNTER FOR GYNECOLOGICAL EXAMINATION (GENERAL) (ROUTINE) W/OUT ABNORMAL FINDINGS: ICD-10-CM

## 2023-05-03 DIAGNOSIS — N84.1 POLYP OF CERVIX UTERI: ICD-10-CM

## 2023-05-03 LAB
HCG UR QL: NEGATIVE
QUALITY CONTROL: YES

## 2023-05-03 PROCEDURE — 81025 URINE PREGNANCY TEST: CPT

## 2023-05-03 PROCEDURE — 58558Z: CUSTOM

## 2023-05-03 PROCEDURE — 76830 TRANSVAGINAL US NON-OB: CPT

## 2023-05-04 NOTE — END OF VISIT
[FreeTextEntry3] : I, Jaz Rodriguez solely acted as scribe for Dr. Emelyn Liao on 05/03/2023 \par All medical entries made by the Scribe were at my, Dr. Liao’s, direction and personally\par dictated by me on 05/03/2023 . I have reviewed the chart and agree that the record\par accurately reflects my personal performance of the history, physical exam, assessment and plan. I\par have also personally directed, reviewed, and agreed with the chart.

## 2023-05-04 NOTE — DISCUSSION/SUMMARY
[FreeTextEntry1] : Previous sono results reviewed in conjunction with today's sono findings. The right ovary presents with a complex cyst measuring 2.6 cm with RT adnexal free fluid noted. The left ovary presents with a simple cyst measuring 2.4 cm. Repeat imaging recommended in three months secondary to cyst surveillance. \par \par We discussed the plan to start Provera for three months and to return to the office for a f/u regarding cyst surveillance and a repeat CBC due to borderline anemia result on her previous labs. Prescription for Provera given and medication instructions discussed. \par \par F/u for pelvic sono in three months or as needed.

## 2023-05-04 NOTE — HISTORY OF PRESENT ILLNESS
[Tubal Occlusion] : has had a tubal occlusion [Y] : Positive pregnancy history [Menarche Age: ____] : age at menarche was [unfilled] [No] : Patient does not have concerns regarding sex [Currently Active] : currently active [Mammogramdate] : 4/26/23 [TextBox_19] : BR3 [BreastSonogramDate] : 4/26/23 [TextBox_25] : BR3 [PapSmeardate] : 4/11/23 [TextBox_31] : NEG [ColonoscopyDate] : 2020 [HPVDate] : 4/11/23 [TextBox_78] : NEG [LMPDate] : 4/22/23 [PGxTotal] : 1 [[facility]xFul[facility]] : 1 [[facility]iving] : 1 [FreeTextEntry1] : 4/22/23

## 2023-05-13 ENCOUNTER — TRANSCRIPTION ENCOUNTER (OUTPATIENT)
Age: 53
End: 2023-05-13

## 2023-05-13 LAB — CORE LAB BIOPSY: NORMAL

## 2023-09-01 ENCOUNTER — APPOINTMENT (OUTPATIENT)
Dept: OBGYN | Facility: CLINIC | Age: 53
End: 2023-09-01
Payer: COMMERCIAL

## 2023-09-01 ENCOUNTER — APPOINTMENT (OUTPATIENT)
Dept: ANTEPARTUM | Facility: CLINIC | Age: 53
End: 2023-09-01
Payer: COMMERCIAL

## 2023-09-01 ENCOUNTER — ASOB RESULT (OUTPATIENT)
Age: 53
End: 2023-09-01

## 2023-09-01 VITALS
DIASTOLIC BLOOD PRESSURE: 64 MMHG | HEIGHT: 64 IN | BODY MASS INDEX: 22.81 KG/M2 | WEIGHT: 133.6 LBS | SYSTOLIC BLOOD PRESSURE: 110 MMHG

## 2023-09-01 DIAGNOSIS — N83.202 UNSPECIFIED OVARIAN CYST, RIGHT SIDE: ICD-10-CM

## 2023-09-01 DIAGNOSIS — D21.9 BENIGN NEOPLASM OF CONNECTIVE AND OTHER SOFT TISSUE, UNSPECIFIED: ICD-10-CM

## 2023-09-01 DIAGNOSIS — N94.6 DYSMENORRHEA, UNSPECIFIED: ICD-10-CM

## 2023-09-01 DIAGNOSIS — N92.1 EXCESSIVE AND FREQUENT MENSTRUATION WITH IRREGULAR CYCLE: ICD-10-CM

## 2023-09-01 DIAGNOSIS — N92.0 EXCESSIVE AND FREQUENT MENSTRUATION WITH REGULAR CYCLE: ICD-10-CM

## 2023-09-01 DIAGNOSIS — N83.201 UNSPECIFIED OVARIAN CYST, RIGHT SIDE: ICD-10-CM

## 2023-09-01 PROCEDURE — 99213 OFFICE O/P EST LOW 20 MIN: CPT

## 2023-09-01 PROCEDURE — 76830 TRANSVAGINAL US NON-OB: CPT

## 2023-09-01 PROCEDURE — 76856 US EXAM PELVIC COMPLETE: CPT | Mod: 59

## 2023-09-01 RX ORDER — IBUPROFEN 600 MG/1
600 TABLET, FILM COATED ORAL 3 TIMES DAILY
Qty: 60 | Refills: 3 | Status: ACTIVE | COMMUNITY
Start: 2023-09-01 | End: 1900-01-01

## 2023-09-06 PROBLEM — N92.1 MENOMETRORRHAGIA: Status: RESOLVED | Noted: 2023-04-11 | Resolved: 2023-09-06

## 2023-09-06 NOTE — HISTORY OF PRESENT ILLNESS
[HPV test offered] : HPV test offered [Menarche Age: ____] : age at menarche was [unfilled] [No] : Patient does not have concerns regarding sex [Y] : Patient is sexually active [Currently Active] : currently active [Mammogramdate] : 4/26/23 [TextBox_19] : BR3 [BreastSonogramDate] : 4/26/23 [TextBox_25] : BR3 [PapSmeardate] : 4/11/23 [TextBox_31] : NEG [HPVDate] : 4/11/23 [TextBox_78] : NEG [LMPDate] : 8/1/23 [PGxTotal] : 1 [[facility]] : 1 [[facility]iving] : 1 [FreeTextEntry1] : 8/1/23

## 2023-09-06 NOTE — END OF VISIT
[FreeTextEntry3] : I, Iker Rae, solely acted as scribe for Dr. Emelyn Liao on 09/01/2023. All medical entries made by the Scribe were at my, Dr. Liao's, direction and personally dictated by me on 09/01/2023. I have reviewed the chart and agree that the record accurately reflects my personal performance of the history, physical exam, assessment and plan. I have also personally directed, reviewed, and agreed with the chart.

## 2023-09-06 NOTE — DISCUSSION/SUMMARY
[FreeTextEntry1] : Discussed sonogram results in conjuPrescription for transvaginal sonogram given to be repeated in April with her annual exam..nction with previous sonogram findings. Discussed unchanging cysts and otherwise normal sonogram.   Prescription refill for Ibuprofen 600 mg given.  F/u annually for pap and repeat sonogram in April and as needed.

## 2023-10-12 ENCOUNTER — OFFICE (OUTPATIENT)
Dept: URBAN - METROPOLITAN AREA CLINIC 12 | Facility: CLINIC | Age: 53
Setting detail: OPHTHALMOLOGY
End: 2023-10-12
Payer: MEDICARE

## 2023-10-12 DIAGNOSIS — H40.013: ICD-10-CM

## 2023-10-12 DIAGNOSIS — H43.393: ICD-10-CM

## 2023-10-12 DIAGNOSIS — H16.223: ICD-10-CM

## 2023-10-12 PROBLEM — H10.50 BLEPHAROCONJUNCTIVITIS UNSPECIFIED: Status: ACTIVE | Noted: 2023-10-12

## 2023-10-12 PROCEDURE — 92014 COMPRE OPH EXAM EST PT 1/>: CPT | Performed by: OPHTHALMOLOGY

## 2023-10-12 PROCEDURE — 92250 FUNDUS PHOTOGRAPHY W/I&R: CPT | Performed by: OPHTHALMOLOGY

## 2023-10-12 PROCEDURE — 92083 EXTENDED VISUAL FIELD XM: CPT | Performed by: OPHTHALMOLOGY

## 2023-10-12 ASSESSMENT — SUPERFICIAL PUNCTATE KERATITIS (SPK)
OS_SPK: T
OD_SPK: T

## 2023-10-12 ASSESSMENT — REFRACTION_MANIFEST
OD_AXIS: 090
OD_AXIS: 100
OD_SPHERE: +1.00
OS_SPHERE: +1.00
OS_CYLINDER: -0.50
OS_VA2: 20/20
OD_CYLINDER: -0.50
OD_VA2: 20/20
OS_SPHERE: +1.00
OD_VA1: 20/20
OS_CYLINDER: -0.25
OD_VA1: 20/25
OS_VA1: 20/20-
OD_SPHERE: +1.50
OS_VA1: 20/20
OD_ADD: +2.00
OS_VA2: 20/20
OD_ADD: +1.25
OS_ADD: +1.25
OS_AXIS: 100
OD_VA2: 20/20
OS_AXIS: 125
OD_CYLINDER: -0.50
OS_ADD: +2.00

## 2023-10-12 ASSESSMENT — REFRACTION_CURRENTRX
OD_AXIS: 096
OD_VPRISM_DIRECTION: SV
OD_SPHERE: +3.00
OS_VPRISM_DIRECTION: SV
OD_CYLINDER: -0.50
OS_OVR_VA: 20/
OS_OVR_VA: 20/
OD_VPRISM_DIRECTION: SV
OD_SPHERE: +2.75
OD_CYLINDER: -0.50
OS_CYLINDER: -0.25
OS_AXIS: 079
OS_CYLINDER: -0.50
OD_AXIS: 093
OD_OVR_VA: 20/
OS_SPHERE: +2.75
OS_VPRISM_DIRECTION: SV
OS_AXIS: 104
OS_SPHERE: +3.00
OD_OVR_VA: 20/

## 2023-10-12 ASSESSMENT — REFRACTION_AUTOREFRACTION
OS_CYLINDER: -0.25
OD_SPHERE: +1.50
OS_SPHERE: +1.00
OS_AXIS: 124
OD_AXIS: 090
OD_CYLINDER: -0.50

## 2023-10-12 ASSESSMENT — CONFRONTATIONAL VISUAL FIELD TEST (CVF)
OS_FINDINGS: FULL
OD_FINDINGS: FULL

## 2023-10-12 ASSESSMENT — KERATOMETRY
OD_AXISANGLE_DEGREES: 079
OD_K1POWER_DIOPTERS: 40.25
OS_K1POWER_DIOPTERS: 40.25
OD_K2POWER_DIOPTERS: 40.75
OS_K2POWER_DIOPTERS: 41.00
OS_AXISANGLE_DEGREES: 088

## 2023-10-12 ASSESSMENT — PACHYMETRY
OS_CT_CORRECTION: 0
OD_CT_UM: 536
OS_CT_UM: 548
OD_CT_CORRECTION: 1

## 2023-10-12 ASSESSMENT — AXIALLENGTH_DERIVED
OS_AL: 24.3294
OS_AL: 24.3813
OD_AL: 24.2233
OD_AL: 24.2233
OS_AL: 24.3294
OD_AL: 24.4304

## 2023-10-12 ASSESSMENT — SPHEQUIV_DERIVED
OD_SPHEQUIV: 1.25
OD_SPHEQUIV: 0.75
OD_SPHEQUIV: 1.25
OS_SPHEQUIV: 0.875
OS_SPHEQUIV: 0.75
OS_SPHEQUIV: 0.875

## 2023-10-12 ASSESSMENT — VISUAL ACUITY
OD_BCVA: 20/30
OS_BCVA: 20/25-

## 2023-10-12 ASSESSMENT — TONOMETRY
OD_IOP_MMHG: 18
OS_IOP_MMHG: 18

## 2024-04-19 ENCOUNTER — OFFICE (OUTPATIENT)
Dept: URBAN - METROPOLITAN AREA CLINIC 12 | Facility: CLINIC | Age: 54
Setting detail: OPHTHALMOLOGY
End: 2024-04-19
Payer: MEDICARE

## 2024-04-19 DIAGNOSIS — H40.013: ICD-10-CM

## 2024-04-19 DIAGNOSIS — H16.223: ICD-10-CM

## 2024-04-19 PROCEDURE — 92133 CPTRZD OPH DX IMG PST SGM ON: CPT | Performed by: STUDENT IN AN ORGANIZED HEALTH CARE EDUCATION/TRAINING PROGRAM

## 2024-04-19 PROCEDURE — 92020 GONIOSCOPY: CPT | Performed by: STUDENT IN AN ORGANIZED HEALTH CARE EDUCATION/TRAINING PROGRAM

## 2024-04-19 PROCEDURE — 99213 OFFICE O/P EST LOW 20 MIN: CPT | Performed by: STUDENT IN AN ORGANIZED HEALTH CARE EDUCATION/TRAINING PROGRAM

## 2024-04-19 PROCEDURE — 92083 EXTENDED VISUAL FIELD XM: CPT | Performed by: STUDENT IN AN ORGANIZED HEALTH CARE EDUCATION/TRAINING PROGRAM

## 2024-05-02 ENCOUNTER — OFFICE (OUTPATIENT)
Dept: URBAN - METROPOLITAN AREA CLINIC 100 | Facility: CLINIC | Age: 54
Setting detail: OPHTHALMOLOGY
End: 2024-05-02
Payer: MEDICARE

## 2024-05-02 ENCOUNTER — RX ONLY (RX ONLY)
Age: 54
End: 2024-05-02

## 2024-05-02 DIAGNOSIS — H52.4: ICD-10-CM

## 2024-05-02 DIAGNOSIS — H40.013: ICD-10-CM

## 2024-05-02 PROCEDURE — 92250 FUNDUS PHOTOGRAPHY W/I&R: CPT | Performed by: OPHTHALMOLOGY

## 2024-05-02 PROCEDURE — 92014 COMPRE OPH EXAM EST PT 1/>: CPT | Performed by: OPHTHALMOLOGY

## 2024-05-02 PROCEDURE — 92015 DETERMINE REFRACTIVE STATE: CPT | Performed by: OPHTHALMOLOGY

## 2024-05-02 ASSESSMENT — CONFRONTATIONAL VISUAL FIELD TEST (CVF)
OS_FINDINGS: FULL
OD_FINDINGS: FULL

## 2024-06-06 ENCOUNTER — APPOINTMENT (OUTPATIENT)
Dept: ANTEPARTUM | Facility: CLINIC | Age: 54
End: 2024-06-06
Payer: MEDICAID

## 2024-06-06 ENCOUNTER — ASOB RESULT (OUTPATIENT)
Age: 54
End: 2024-06-06

## 2024-06-06 ENCOUNTER — APPOINTMENT (OUTPATIENT)
Dept: OBGYN | Facility: CLINIC | Age: 54
End: 2024-06-06
Payer: MEDICAID

## 2024-06-06 VITALS
BODY MASS INDEX: 22.71 KG/M2 | HEIGHT: 64 IN | SYSTOLIC BLOOD PRESSURE: 100 MMHG | DIASTOLIC BLOOD PRESSURE: 58 MMHG | WEIGHT: 133 LBS

## 2024-06-06 DIAGNOSIS — R92.30 DENSE BREASTS, UNSPECIFIED: ICD-10-CM

## 2024-06-06 DIAGNOSIS — N64.4 MASTODYNIA: ICD-10-CM

## 2024-06-06 DIAGNOSIS — M79.622 PAIN IN LEFT UPPER ARM: ICD-10-CM

## 2024-06-06 PROCEDURE — 76857 US EXAM PELVIC LIMITED: CPT | Mod: 59

## 2024-06-06 PROCEDURE — 99212 OFFICE O/P EST SF 10 MIN: CPT | Mod: 25

## 2024-06-06 PROCEDURE — 76830 TRANSVAGINAL US NON-OB: CPT

## 2024-06-06 PROCEDURE — 99396 PREV VISIT EST AGE 40-64: CPT | Mod: 25

## 2024-06-06 PROCEDURE — 99459 PELVIC EXAMINATION: CPT

## 2024-06-06 NOTE — HISTORY OF PRESENT ILLNESS
[N] : Patient reports normal menses [Y] : Positive pregnancy history [Menarche Age: ____] : age at menarche was [unfilled] [No] : Patient does not have concerns regarding sex [Currently Active] : currently active [Men] : men [Mammogramdate] : 04/26/23 [TextBox_19] : BR 3 [BreastSonogramDate] : 04/26/23 [TextBox_25] : BR 3 [PapSmeardate] : 04/11/23 [TextBox_31] : NEG - [HPVDate] : 04/11/23 [TextBox_78] : NEG - [LMPDate] : 05/20/24 [PGxTotal] : 1 [[facility]xFulerm] : 1 [[facility]] : 1 [FreeTextEntry1] : 05/20/24

## 2024-06-06 NOTE — PLAN
[FreeTextEntry1] : -F/u pap, STI screening -Monitor for return of abnormal discharge, any vulvovaginal itching/ burning  -Rx diagnostic mammogram/BUS; f/u from prior BR-3 imaging, investigate abnormality to left breast implant -Likely recommend breast surgery f/u even if imaging is WNL  -Rx left axillary US for pain -Discussed cervical polyp, she will return for removal with MD  -Return 6 months to repeat TVUS, discussed left simple cyst is similar in dimension to imaging in 9/2023  -Call or RTO PRN for any new problems, questions or concerns

## 2024-06-06 NOTE — PHYSICAL EXAM
[Chaperone Present] : A chaperone was present in the examining room during all aspects of the physical examination [[id number]] : A chaperone was present during the pelvic exam. [Appropriately responsive] : appropriately responsive [Alert] : alert [No Acute Distress] : no acute distress [Soft] : soft [Non-tender] : non-tender [Non-distended] : non-distended [No Mass] : no mass [Examination Of The Breasts] : a normal appearance [] : implants [No Masses] : no breast masses were palpable [Labia Majora] : normal [Labia Minora] : normal [Polyp ___ cm] : [unfilled] ~[facility] polyp [Normal] : normal [Uterine Adnexae] : normal [FreeTextEntry2] : LANDEN Chapa  [FreeTextEntry6] : "bleb" of implant just inferior to left areola; tender in left axilla

## 2024-06-06 NOTE — REASON FOR VISIT
[Annual] : an annual visit. [[employer] ] : provided by [employer]   [FreeTextEntry2] : TV SONO + ENRIQUE  [Interpreters_IDNumber] : [id number]

## 2024-06-06 NOTE — REVIEW OF SYSTEMS
[Breast Pain] : breast pain [Negative] : Gastrointestinal [FreeTextEntry8] : intermittent discharge [de-identified] : left axillary pain

## 2024-06-07 LAB — HIV1+2 AB SPEC QL IA.RAPID: NONREACTIVE

## 2024-06-08 LAB
C TRACH RRNA SPEC QL NAA+PROBE: NOT DETECTED
HAV IGM SER QL: NONREACTIVE
HBV CORE IGM SER QL: NONREACTIVE
HBV SURFACE AG SER QL: NONREACTIVE
HCV AB SER QL: NONREACTIVE
HCV S/CO RATIO: 0.09 S/CO
HPV HIGH+LOW RISK DNA PNL CVX: NOT DETECTED
N GONORRHOEA RRNA SPEC QL NAA+PROBE: NOT DETECTED
SOURCE TP AMPLIFICATION: NORMAL
T PALLIDUM AB SER QL IA: NEGATIVE

## 2024-06-10 LAB — CYTOLOGY CVX/VAG DOC THIN PREP: NORMAL

## 2024-06-12 ENCOUNTER — OUTPATIENT (OUTPATIENT)
Dept: OUTPATIENT SERVICES | Facility: HOSPITAL | Age: 54
LOS: 1 days | End: 2024-06-12
Payer: MEDICAID

## 2024-06-12 ENCOUNTER — APPOINTMENT (OUTPATIENT)
Dept: MAMMOGRAPHY | Facility: CLINIC | Age: 54
End: 2024-06-12
Payer: MEDICAID

## 2024-06-12 ENCOUNTER — APPOINTMENT (OUTPATIENT)
Dept: ULTRASOUND IMAGING | Facility: CLINIC | Age: 54
End: 2024-06-12
Payer: MEDICAID

## 2024-06-12 ENCOUNTER — RESULT REVIEW (OUTPATIENT)
Age: 54
End: 2024-06-12

## 2024-06-12 DIAGNOSIS — R92.30 DENSE BREASTS, UNSPECIFIED: ICD-10-CM

## 2024-06-12 DIAGNOSIS — N64.4 MASTODYNIA: ICD-10-CM

## 2024-06-12 DIAGNOSIS — R92.8 OTHER ABNORMAL AND INCONCLUSIVE FINDINGS ON DIAGNOSTIC IMAGING OF BREAST: ICD-10-CM

## 2024-06-12 PROCEDURE — 77066 DX MAMMO INCL CAD BI: CPT | Mod: 26

## 2024-06-12 PROCEDURE — 76641 ULTRASOUND BREAST COMPLETE: CPT | Mod: 26,50

## 2024-06-12 PROCEDURE — G0279: CPT | Mod: 26

## 2024-06-12 PROCEDURE — 77066 DX MAMMO INCL CAD BI: CPT

## 2024-06-12 PROCEDURE — 76641 ULTRASOUND BREAST COMPLETE: CPT

## 2024-06-12 PROCEDURE — G0279: CPT

## 2024-06-19 ENCOUNTER — APPOINTMENT (OUTPATIENT)
Dept: OBGYN | Facility: CLINIC | Age: 54
End: 2024-06-19
Payer: MEDICAID

## 2024-06-19 ENCOUNTER — NON-APPOINTMENT (OUTPATIENT)
Age: 54
End: 2024-06-19

## 2024-06-19 VITALS
BODY MASS INDEX: 22.2 KG/M2 | DIASTOLIC BLOOD PRESSURE: 60 MMHG | HEIGHT: 64 IN | SYSTOLIC BLOOD PRESSURE: 100 MMHG | WEIGHT: 130 LBS

## 2024-06-19 DIAGNOSIS — Z11.3 ENCOUNTER FOR SCREENING FOR INFECTIONS WITH A PREDOMINANTLY SEXUAL MODE OF TRANSMISSION: ICD-10-CM

## 2024-06-19 DIAGNOSIS — N83.292 OTHER OVARIAN CYST, LEFT SIDE: ICD-10-CM

## 2024-06-19 DIAGNOSIS — N84.1 POLYP OF CERVIX UTERI: ICD-10-CM

## 2024-06-19 DIAGNOSIS — R92.8 OTHER ABNORMAL AND INCONCLUSIVE FINDINGS ON DIAGNOSTIC IMAGING OF BREAST: ICD-10-CM

## 2024-06-19 DIAGNOSIS — Z92.89 PERSONAL HISTORY OF OTHER MEDICAL TREATMENT: ICD-10-CM

## 2024-06-19 DIAGNOSIS — R10.2 PELVIC AND PERINEAL PAIN: ICD-10-CM

## 2024-06-19 DIAGNOSIS — Z01.411 ENCOUNTER FOR GYNECOLOGICAL EXAMINATION (GENERAL) (ROUTINE) WITH ABNORMAL FINDINGS: ICD-10-CM

## 2024-06-19 DIAGNOSIS — Z12.4 ENCOUNTER FOR SCREENING FOR MALIGNANT NEOPLASM OF CERVIX: ICD-10-CM

## 2024-06-19 PROCEDURE — 57500 BIOPSY OF CERVIX: CPT

## 2024-06-19 RX ORDER — IBUPROFEN 600 MG/1
600 TABLET, FILM COATED ORAL EVERY 6 HOURS
Qty: 56 | Refills: 0 | Status: DISCONTINUED | COMMUNITY
Start: 2022-05-03 | End: 2024-06-19

## 2024-06-19 RX ORDER — MELOXICAM 10 MG/1
CAPSULE ORAL
Refills: 0 | Status: DISCONTINUED | COMMUNITY
End: 2024-06-19

## 2024-06-19 RX ORDER — IBUPROFEN 600 MG/1
600 TABLET, FILM COATED ORAL 3 TIMES DAILY
Qty: 60 | Refills: 2 | Status: DISCONTINUED | COMMUNITY
Start: 2023-04-11 | End: 2024-06-19

## 2024-06-19 RX ORDER — PROGESTERONE 200 MG/1
200 CAPSULE ORAL
Qty: 20 | Refills: 0 | Status: DISCONTINUED | COMMUNITY
Start: 2023-05-03 | End: 2024-06-19

## 2024-06-19 NOTE — PHYSICAL EXAM
[Chaperone Present] : A chaperone was present in the examining room during all aspects of the physical examination [[id number]] : A chaperone was present during the pelvic exam. [Appropriately responsive] : appropriately responsive [Alert] : alert [No Acute Distress] : no acute distress [Oriented x3] : oriented x3 [Labia Majora] : normal [Labia Minora] : normal [No Bleeding] : There was no active vaginal bleeding [Polyp ___ cm] : [unfilled] ~[facility] polyp [Normal] : normal [Uterine Adnexae] : normal

## 2024-06-23 PROBLEM — R10.2 PELVIC PRESSURE IN FEMALE: Status: RESOLVED | Noted: 2022-04-05 | Resolved: 2024-06-23

## 2024-06-23 PROBLEM — Z92.89 HISTORY OF SCREENING MAMMOGRAPHY: Status: RESOLVED | Noted: 2022-04-05 | Resolved: 2024-06-23

## 2024-06-23 PROBLEM — R92.8 ABNORMAL FINDING ON BREAST IMAGING: Status: RESOLVED | Noted: 2022-04-28 | Resolved: 2024-06-23

## 2024-06-23 PROBLEM — Z12.4 SCREENING FOR MALIGNANT NEOPLASM OF CERVIX: Status: RESOLVED | Noted: 2020-02-21 | Resolved: 2024-06-23

## 2024-06-23 PROBLEM — Z11.3 SCREEN FOR STD (SEXUALLY TRANSMITTED DISEASE): Status: RESOLVED | Noted: 2024-06-06 | Resolved: 2024-06-23

## 2024-06-23 PROBLEM — Z01.411 ENCOUNTER FOR WELL WOMAN EXAM WITH ABNORMAL FINDINGS: Status: RESOLVED | Noted: 2022-04-05 | Resolved: 2024-06-23

## 2024-06-23 NOTE — HISTORY OF PRESENT ILLNESS
[Patient reported colonoscopy was normal] : Patient reported colonoscopy was normal [HPV test offered] : HPV test offered [N] : Patient reports normal menses [Y] : Positive pregnancy history [Menarche Age: ____] : age at menarche was [unfilled] [Currently Active] : currently active [Men] : men [Mammogramdate] : 06/12/2024 [TextBox_19] : BR2 [BreastSonogramDate] : 06/12/2024 [TextBox_25] : BR2 [PapSmeardate] : 06/06/2024 [TextBox_31] : Negative [ColonoscopyDate] : 2020 [HPVDate] : 06/06/2024 [LMPDate] : 06/15/2024 [TextBox_78] : Negative [de-identified] : had a Tubal Ligation.  [PGxTotal] : 1 [[facility]iving] : 1 [[facility]xFulerm] : 1 [FreeTextEntry1] : 06/15/2024

## 2024-06-25 ENCOUNTER — TRANSCRIPTION ENCOUNTER (OUTPATIENT)
Age: 54
End: 2024-06-25

## 2024-06-25 LAB — CORE LAB BIOPSY: NORMAL

## 2024-06-27 ENCOUNTER — NON-APPOINTMENT (OUTPATIENT)
Age: 54
End: 2024-06-27

## 2024-07-02 ENCOUNTER — APPOINTMENT (OUTPATIENT)
Dept: BREAST CENTER | Facility: CLINIC | Age: 54
End: 2024-07-02
Payer: MEDICAID

## 2024-07-02 VITALS
OXYGEN SATURATION: 97 % | DIASTOLIC BLOOD PRESSURE: 67 MMHG | HEIGHT: 64 IN | BODY MASS INDEX: 22.2 KG/M2 | RESPIRATION RATE: 16 BRPM | HEART RATE: 81 BPM | SYSTOLIC BLOOD PRESSURE: 111 MMHG | WEIGHT: 130 LBS

## 2024-07-02 DIAGNOSIS — N60.02 SOLITARY CYST OF LEFT BREAST: ICD-10-CM

## 2024-07-02 DIAGNOSIS — N64.4 MASTODYNIA: ICD-10-CM

## 2024-07-02 PROCEDURE — 99204 OFFICE O/P NEW MOD 45 MIN: CPT | Mod: 25

## 2024-07-02 PROCEDURE — 10005 FNA BX W/US GDN 1ST LES: CPT

## 2024-07-05 ENCOUNTER — NON-APPOINTMENT (OUTPATIENT)
Age: 54
End: 2024-07-05

## 2024-07-08 LAB — OTHER FLUID CYTOLOGY: NORMAL

## 2024-07-09 ENCOUNTER — APPOINTMENT (OUTPATIENT)
Dept: BREAST CENTER | Facility: CLINIC | Age: 54
End: 2024-07-09

## 2024-12-12 ENCOUNTER — APPOINTMENT (OUTPATIENT)
Dept: OBGYN | Facility: CLINIC | Age: 54
End: 2024-12-12
Payer: COMMERCIAL

## 2024-12-12 ENCOUNTER — APPOINTMENT (OUTPATIENT)
Dept: ANTEPARTUM | Facility: CLINIC | Age: 54
End: 2024-12-12
Payer: COMMERCIAL

## 2024-12-12 ENCOUNTER — ASOB RESULT (OUTPATIENT)
Age: 54
End: 2024-12-12

## 2024-12-12 VITALS
WEIGHT: 130 LBS | BODY MASS INDEX: 22.2 KG/M2 | DIASTOLIC BLOOD PRESSURE: 60 MMHG | HEIGHT: 64 IN | SYSTOLIC BLOOD PRESSURE: 104 MMHG

## 2024-12-12 DIAGNOSIS — N84.1 POLYP OF CERVIX UTERI: ICD-10-CM

## 2024-12-12 DIAGNOSIS — N83.292 OTHER OVARIAN CYST, LEFT SIDE: ICD-10-CM

## 2024-12-12 DIAGNOSIS — N83.201 UNSPECIFIED OVARIAN CYST, RIGHT SIDE: ICD-10-CM

## 2024-12-12 DIAGNOSIS — N92.1 EXCESSIVE AND FREQUENT MENSTRUATION WITH IRREGULAR CYCLE: ICD-10-CM

## 2024-12-12 DIAGNOSIS — N83.202 UNSPECIFIED OVARIAN CYST, RIGHT SIDE: ICD-10-CM

## 2024-12-12 PROCEDURE — 76830 TRANSVAGINAL US NON-OB: CPT

## 2024-12-12 PROCEDURE — 99214 OFFICE O/P EST MOD 30 MIN: CPT

## 2024-12-12 PROCEDURE — 76857 US EXAM PELVIC LIMITED: CPT | Mod: 59

## 2025-03-13 ENCOUNTER — ASOB RESULT (OUTPATIENT)
Age: 55
End: 2025-03-13

## 2025-03-13 ENCOUNTER — APPOINTMENT (OUTPATIENT)
Dept: ANTEPARTUM | Facility: CLINIC | Age: 55
End: 2025-03-13
Payer: COMMERCIAL

## 2025-03-13 ENCOUNTER — APPOINTMENT (OUTPATIENT)
Dept: OBGYN | Facility: CLINIC | Age: 55
End: 2025-03-13
Payer: COMMERCIAL

## 2025-03-13 VITALS
SYSTOLIC BLOOD PRESSURE: 114 MMHG | HEIGHT: 64 IN | DIASTOLIC BLOOD PRESSURE: 76 MMHG | BODY MASS INDEX: 23.22 KG/M2 | WEIGHT: 136 LBS

## 2025-03-13 DIAGNOSIS — N83.292 OTHER OVARIAN CYST, LEFT SIDE: ICD-10-CM

## 2025-03-13 PROCEDURE — 99213 OFFICE O/P EST LOW 20 MIN: CPT

## 2025-03-13 PROCEDURE — 81025 URINE PREGNANCY TEST: CPT

## 2025-03-13 PROCEDURE — 76830 TRANSVAGINAL US NON-OB: CPT

## 2025-03-13 RX ORDER — IBUPROFEN 600 MG/1
600 TABLET, FILM COATED ORAL 3 TIMES DAILY
Qty: 60 | Refills: 0 | Status: ACTIVE | COMMUNITY
Start: 2025-03-13 | End: 1900-01-01

## 2025-03-16 LAB
HCG UR QL: NEGATIVE
QUALITY CONTROL: YES

## 2025-05-01 ENCOUNTER — OFFICE (OUTPATIENT)
Dept: URBAN - METROPOLITAN AREA CLINIC 100 | Facility: CLINIC | Age: 55
Setting detail: OPHTHALMOLOGY
End: 2025-05-01
Payer: COMMERCIAL

## 2025-05-01 DIAGNOSIS — H52.4: ICD-10-CM

## 2025-05-01 DIAGNOSIS — H40.013: ICD-10-CM

## 2025-05-01 DIAGNOSIS — H43.393: ICD-10-CM

## 2025-05-01 PROCEDURE — 92014 COMPRE OPH EXAM EST PT 1/>: CPT | Performed by: OPHTHALMOLOGY

## 2025-05-01 PROCEDURE — 92015 DETERMINE REFRACTIVE STATE: CPT | Performed by: OPHTHALMOLOGY

## 2025-05-01 ASSESSMENT — REFRACTION_MANIFEST
OS_CYLINDER: -0.25
OD_AXIS: 090
OS_ADD: +2.00
OD_CYLINDER: -0.75
OD_CYLINDER: -0.50
OS_AXIS: 100
OS_AXIS: 110
OS_VA1: 20/20
OD_VA1: 20/20-
OD_SPHERE: +1.25
OS_ADD: +2.00
OD_AXIS: 100
OD_ADD: +2.00
OD_SPHERE: +2.00
OD_VA1: 20/25+
OS_SPHERE: +1.25
OS_CYLINDER: -0.50
OS_VA1: 20/20
OS_SPHERE: +1.00
OD_ADD: +2.00

## 2025-05-01 ASSESSMENT — REFRACTION_CURRENTRX
OS_CYLINDER: -0.25
OD_VPRISM_DIRECTION: SV
OD_AXIS: 081
OD_AXIS: 086
OS_SPHERE: +3.00
OS_AXIS: 073
OS_VPRISM_DIRECTION: SV
OD_CYLINDER: -0.75
OD_VPRISM_DIRECTION: SV
OS_OVR_VA: 20/
OS_VPRISM_DIRECTION: SV
OD_SPHERE: +4.00
OD_CYLINDER: -0.50
OS_SPHERE: +3.00
OS_AXIS: 104
OS_CYLINDER: -0.25
OD_OVR_VA: 20/
OD_SPHERE: +3.00
OS_OVR_VA: 20/
OD_OVR_VA: 20/

## 2025-05-01 ASSESSMENT — TONOMETRY
OS_IOP_MMHG: 18
OD_IOP_MMHG: 18

## 2025-05-01 ASSESSMENT — CONFRONTATIONAL VISUAL FIELD TEST (CVF)
OD_FINDINGS: FULL
OS_FINDINGS: FULL

## 2025-05-01 ASSESSMENT — REFRACTION_AUTOREFRACTION
OD_SPHERE: +1.50
OD_AXIS: 100
OS_AXIS: 101
OS_SPHERE: +1.50
OD_CYLINDER: -0.50
OS_CYLINDER: -0.50

## 2025-05-01 ASSESSMENT — KERATOMETRY
OS_AXISANGLE_DEGREES: 093
OD_K1POWER_DIOPTERS: 40.25
OD_K2POWER_DIOPTERS: 40.75
OS_K1POWER_DIOPTERS: 40.50
OS_K2POWER_DIOPTERS: 41.00
OD_AXISANGLE_DEGREES: 083
METHOD_AUTO_MANUAL: AUTO

## 2025-05-01 ASSESSMENT — VISUAL ACUITY
OD_BCVA: 20/40+
OS_BCVA: 20/40+

## 2025-05-01 ASSESSMENT — SUPERFICIAL PUNCTATE KERATITIS (SPK)
OD_SPK: T
OS_SPK: T

## 2025-05-01 ASSESSMENT — PACHYMETRY
OD_CT_CORRECTION: 1
OD_CT_UM: 536

## 2025-06-12 ENCOUNTER — APPOINTMENT (OUTPATIENT)
Dept: OBGYN | Facility: CLINIC | Age: 55
End: 2025-06-12

## 2025-06-12 ENCOUNTER — ASOB RESULT (OUTPATIENT)
Age: 55
End: 2025-06-12

## 2025-06-12 ENCOUNTER — APPOINTMENT (OUTPATIENT)
Dept: ANTEPARTUM | Facility: CLINIC | Age: 55
End: 2025-06-12
Payer: COMMERCIAL

## 2025-06-12 VITALS
BODY MASS INDEX: 22.88 KG/M2 | HEIGHT: 64 IN | SYSTOLIC BLOOD PRESSURE: 112 MMHG | WEIGHT: 134 LBS | DIASTOLIC BLOOD PRESSURE: 64 MMHG

## 2025-06-12 PROBLEM — Z01.411 ENCOUNTER FOR WELL WOMAN EXAM WITH ABNORMAL FINDINGS: Status: ACTIVE | Noted: 2025-06-12

## 2025-06-12 PROBLEM — N94.6 DYSMENORRHEA: Status: ACTIVE | Noted: 2025-06-12

## 2025-06-12 PROBLEM — N84.1 CERVICAL POLYP: Status: ACTIVE | Noted: 2025-06-12

## 2025-06-12 PROBLEM — Z87.42 HISTORY OF DYSMENORRHEA: Status: RESOLVED | Noted: 2023-04-11 | Resolved: 2025-06-12

## 2025-06-12 PROBLEM — N95.2 VAGINAL ATROPHY: Status: ACTIVE | Noted: 2025-06-12

## 2025-06-12 PROBLEM — Z12.31 ENCOUNTER FOR SCREENING MAMMOGRAM FOR BREAST CANCER: Status: ACTIVE | Noted: 2025-06-12 | Resolved: 2025-06-26

## 2025-06-12 PROBLEM — N89.8 VAGINAL ODOR: Status: ACTIVE | Noted: 2025-06-12

## 2025-06-12 PROBLEM — N89.8 VAGINAL DRYNESS: Status: ACTIVE | Noted: 2025-06-12

## 2025-06-12 PROCEDURE — 76857 US EXAM PELVIC LIMITED: CPT | Mod: 59

## 2025-06-12 PROCEDURE — 57500 BIOPSY OF CERVIX: CPT

## 2025-06-12 PROCEDURE — 99459 PELVIC EXAMINATION: CPT

## 2025-06-12 PROCEDURE — A4649 SURGICAL SUPPLIES: CPT

## 2025-06-12 PROCEDURE — 76830 TRANSVAGINAL US NON-OB: CPT

## 2025-06-12 PROCEDURE — 99396 PREV VISIT EST AGE 40-64: CPT

## 2025-06-12 PROCEDURE — 99214 OFFICE O/P EST MOD 30 MIN: CPT | Mod: 25

## 2025-06-12 RX ORDER — IBUPROFEN 600 MG/1
600 TABLET, FILM COATED ORAL EVERY 6 HOURS
Qty: 120 | Refills: 3 | Status: ACTIVE | COMMUNITY
Start: 2025-06-12 | End: 1900-01-01

## 2025-06-12 RX ORDER — ESTRADIOL 10 UG/1
10 TABLET VAGINAL
Qty: 3 | Refills: 3 | Status: ACTIVE | COMMUNITY
Start: 2025-06-12 | End: 1900-01-01

## 2025-06-12 RX ORDER — METRONIDAZOLE 7.5 MG/G
0.75 GEL VAGINAL
Qty: 1 | Refills: 2 | Status: ACTIVE | COMMUNITY
Start: 2025-06-12 | End: 1900-01-01

## 2025-06-16 LAB
BV BACTERIA RRNA VAG QL NAA+PROBE: NOT DETECTED
C GLABRATA RNA VAG QL NAA+PROBE: NOT DETECTED
CANDIDA RRNA VAG QL PROBE: NOT DETECTED
T VAGINALIS RRNA SPEC QL NAA+PROBE: NOT DETECTED

## 2025-06-18 LAB — CORE LAB BIOPSY: NORMAL

## 2025-06-26 ENCOUNTER — APPOINTMENT (OUTPATIENT)
Dept: ANTEPARTUM | Facility: CLINIC | Age: 55
End: 2025-06-26

## 2025-06-26 ENCOUNTER — APPOINTMENT (OUTPATIENT)
Dept: OBGYN | Facility: CLINIC | Age: 55
End: 2025-06-26

## 2025-09-19 ENCOUNTER — RESULT REVIEW (OUTPATIENT)
Age: 55
End: 2025-09-19

## 2025-09-19 ENCOUNTER — APPOINTMENT (OUTPATIENT)
Dept: ULTRASOUND IMAGING | Facility: CLINIC | Age: 55
End: 2025-09-19
Payer: COMMERCIAL

## 2025-09-19 ENCOUNTER — APPOINTMENT (OUTPATIENT)
Dept: MAMMOGRAPHY | Facility: CLINIC | Age: 55
End: 2025-09-19
Payer: COMMERCIAL

## 2025-09-19 PROCEDURE — 77067 SCR MAMMO BI INCL CAD: CPT | Mod: 26

## 2025-09-19 PROCEDURE — 77063 BREAST TOMOSYNTHESIS BI: CPT | Mod: 26

## 2025-09-19 PROCEDURE — 76641 ULTRASOUND BREAST COMPLETE: CPT | Mod: 26,50

## 2025-09-22 PROBLEM — R92.8 ABNORMAL FINDING ON BREAST IMAGING: Status: ACTIVE | Noted: 2022-04-28
